# Patient Record
Sex: MALE | Race: BLACK OR AFRICAN AMERICAN | NOT HISPANIC OR LATINO | Employment: FULL TIME | URBAN - METROPOLITAN AREA
[De-identification: names, ages, dates, MRNs, and addresses within clinical notes are randomized per-mention and may not be internally consistent; named-entity substitution may affect disease eponyms.]

---

## 2021-07-20 ENCOUNTER — APPOINTMENT (OUTPATIENT)
Dept: RADIOLOGY | Facility: MEDICAL CENTER | Age: 56
DRG: 637 | End: 2021-07-20
Attending: EMERGENCY MEDICINE
Payer: COMMERCIAL

## 2021-07-20 ENCOUNTER — HOSPITAL ENCOUNTER (INPATIENT)
Facility: MEDICAL CENTER | Age: 56
LOS: 5 days | DRG: 637 | End: 2021-07-25
Attending: EMERGENCY MEDICINE | Admitting: INTERNAL MEDICINE
Payer: COMMERCIAL

## 2021-07-20 DIAGNOSIS — E11.11 DIABETIC KETOACIDOSIS WITH COMA ASSOCIATED WITH TYPE 2 DIABETES MELLITUS (HCC): ICD-10-CM

## 2021-07-20 PROBLEM — E11.00 HYPEROSMOLAR HYPERGLYCEMIC STATE (HHS) (HCC): Status: ACTIVE | Noted: 2021-07-20

## 2021-07-20 PROBLEM — N17.9 ACUTE KIDNEY INJURY (HCC): Status: ACTIVE | Noted: 2021-07-20

## 2021-07-20 PROBLEM — G93.41 ACUTE METABOLIC ENCEPHALOPATHY: Status: ACTIVE | Noted: 2021-07-20

## 2021-07-20 PROBLEM — I10 ESSENTIAL HYPERTENSION: Status: ACTIVE | Noted: 2021-07-20

## 2021-07-20 PROBLEM — E87.0 HYPERNATREMIA: Status: ACTIVE | Noted: 2021-07-20

## 2021-07-20 PROBLEM — R91.8 LUNG FIELD ABNORMAL FINDING ON EXAMINATION: Status: ACTIVE | Noted: 2021-07-20

## 2021-07-20 PROBLEM — Z21 HIV POSITIVE (HCC): Status: ACTIVE | Noted: 2021-07-20

## 2021-07-20 LAB
ALBUMIN SERPL BCP-MCNC: 4.4 G/DL (ref 3.2–4.9)
ALBUMIN/GLOB SERPL: 1 G/DL
ALP SERPL-CCNC: 117 U/L (ref 30–99)
ALT SERPL-CCNC: 32 U/L (ref 2–50)
ANION GAP SERPL CALC-SCNC: 23 MMOL/L (ref 7–16)
ANION GAP SERPL CALC-SCNC: 28 MMOL/L (ref 7–16)
ANION GAP SERPL CALC-SCNC: 29 MMOL/L (ref 7–16)
APPEARANCE UR: CLEAR
APPEARANCE UR: CLEAR
AST SERPL-CCNC: 15 U/L (ref 12–45)
B-OH-BUTYR SERPL-MCNC: >8 MMOL/L (ref 0.02–0.27)
BACTERIA #/AREA URNS HPF: ABNORMAL /HPF
BACTERIA #/AREA URNS HPF: ABNORMAL /HPF
BASE EXCESS BLDV CALC-SCNC: -13 MMOL/L
BASOPHILS # BLD AUTO: 0.3 % (ref 0–1.8)
BASOPHILS # BLD: 0.03 K/UL (ref 0–0.12)
BILIRUB SERPL-MCNC: 0.3 MG/DL (ref 0.1–1.5)
BILIRUB UR QL STRIP.AUTO: NEGATIVE
BILIRUB UR QL STRIP.AUTO: NEGATIVE
BODY TEMPERATURE: ABNORMAL CENTIGRADE
BUN SERPL-MCNC: 70 MG/DL (ref 8–22)
BUN SERPL-MCNC: 76 MG/DL (ref 8–22)
BUN SERPL-MCNC: 86 MG/DL (ref 8–22)
CALCIUM SERPL-MCNC: 7.9 MG/DL (ref 8.5–10.5)
CALCIUM SERPL-MCNC: 8.5 MG/DL (ref 8.5–10.5)
CALCIUM SERPL-MCNC: 8.7 MG/DL (ref 8.5–10.5)
CHLORIDE SERPL-SCNC: 100 MMOL/L (ref 96–112)
CHLORIDE SERPL-SCNC: 113 MMOL/L (ref 96–112)
CHLORIDE SERPL-SCNC: 122 MMOL/L (ref 96–112)
CHLORIDE UR-SCNC: <20 MMOL/L
CO2 SERPL-SCNC: 13 MMOL/L (ref 20–33)
CO2 SERPL-SCNC: 16 MMOL/L (ref 20–33)
CO2 SERPL-SCNC: 18 MMOL/L (ref 20–33)
COLOR UR: YELLOW
COLOR UR: YELLOW
CREAT SERPL-MCNC: 2.87 MG/DL (ref 0.5–1.4)
CREAT SERPL-MCNC: 3.62 MG/DL (ref 0.5–1.4)
CREAT SERPL-MCNC: 4.51 MG/DL (ref 0.5–1.4)
CREAT UR-MCNC: 42.44 MG/DL
EOSINOPHIL # BLD AUTO: 0 K/UL (ref 0–0.51)
EOSINOPHIL NFR BLD: 0 % (ref 0–6.9)
EPI CELLS #/AREA URNS HPF: ABNORMAL /HPF
EPI CELLS #/AREA URNS HPF: ABNORMAL /HPF
ERYTHROCYTE [DISTWIDTH] IN BLOOD BY AUTOMATED COUNT: 51.3 FL (ref 35.9–50)
GLOBULIN SER CALC-MCNC: 4.3 G/DL (ref 1.9–3.5)
GLUCOSE SERPL-MCNC: 1001 MG/DL (ref 65–99)
GLUCOSE SERPL-MCNC: 1096 MG/DL (ref 65–99)
GLUCOSE SERPL-MCNC: 1292 MG/DL (ref 65–99)
GLUCOSE SERPL-MCNC: 722 MG/DL (ref 65–99)
GLUCOSE SERPL-MCNC: 894 MG/DL (ref 65–99)
GLUCOSE UR STRIP.AUTO-MCNC: >=1000 MG/DL
GLUCOSE UR STRIP.AUTO-MCNC: >=1000 MG/DL
HCO3 BLDV-SCNC: 14 MMOL/L (ref 24–28)
HCT VFR BLD AUTO: 41.4 % (ref 42–52)
HGB BLD-MCNC: 12.6 G/DL (ref 14–18)
HYALINE CASTS #/AREA URNS LPF: ABNORMAL /LPF
HYALINE CASTS #/AREA URNS LPF: ABNORMAL /LPF
IMM GRANULOCYTES # BLD AUTO: 0.05 K/UL (ref 0–0.11)
IMM GRANULOCYTES NFR BLD AUTO: 0.4 % (ref 0–0.9)
INR PPP: 1.37 (ref 0.87–1.13)
KETONES UR STRIP.AUTO-MCNC: ABNORMAL MG/DL
KETONES UR STRIP.AUTO-MCNC: ABNORMAL MG/DL
LACTATE BLD-SCNC: 1.7 MMOL/L (ref 0.5–2)
LACTATE BLD-SCNC: 2.2 MMOL/L (ref 0.5–2)
LACTATE BLD-SCNC: 3.6 MMOL/L (ref 0.5–2)
LEUKOCYTE ESTERASE UR QL STRIP.AUTO: NEGATIVE
LEUKOCYTE ESTERASE UR QL STRIP.AUTO: NEGATIVE
LYMPHOCYTES # BLD AUTO: 0.91 K/UL (ref 1–4.8)
LYMPHOCYTES NFR BLD: 7.9 % (ref 22–41)
MAGNESIUM SERPL-MCNC: 4 MG/DL (ref 1.5–2.5)
MCH RBC QN AUTO: 28.1 PG (ref 27–33)
MCHC RBC AUTO-ENTMCNC: 30.4 G/DL (ref 33.7–35.3)
MCV RBC AUTO: 92.2 FL (ref 81.4–97.8)
MICRO URNS: ABNORMAL
MICRO URNS: ABNORMAL
MONOCYTES # BLD AUTO: 1.1 K/UL (ref 0–0.85)
MONOCYTES NFR BLD AUTO: 9.5 % (ref 0–13.4)
MUCOUS THREADS #/AREA URNS HPF: ABNORMAL /HPF
MUCOUS THREADS #/AREA URNS HPF: ABNORMAL /HPF
NEUTROPHILS # BLD AUTO: 9.46 K/UL (ref 1.82–7.42)
NEUTROPHILS NFR BLD: 81.9 % (ref 44–72)
NITRITE UR QL STRIP.AUTO: NEGATIVE
NITRITE UR QL STRIP.AUTO: NEGATIVE
NRBC # BLD AUTO: 0 K/UL
NRBC BLD-RTO: 0 /100 WBC
OSMOLALITY SERPL: 425 MOSM/KG H2O (ref 278–298)
PCO2 BLDV: 34.4 MMHG (ref 41–51)
PH BLDV: 7.23 [PH] (ref 7.31–7.45)
PH UR STRIP.AUTO: 5 [PH] (ref 5–8)
PH UR STRIP.AUTO: 5 [PH] (ref 5–8)
PHOSPHATE SERPL-MCNC: 7.3 MG/DL (ref 2.5–4.5)
PHOSPHATE SERPL-MCNC: 7.4 MG/DL (ref 2.5–4.5)
PLATELET # BLD AUTO: 240 K/UL (ref 164–446)
PMV BLD AUTO: 11.5 FL (ref 9–12.9)
PO2 BLDV: 79.3 MMHG (ref 25–40)
POTASSIUM SERPL-SCNC: 4.9 MMOL/L (ref 3.6–5.5)
POTASSIUM SERPL-SCNC: 5.7 MMOL/L (ref 3.6–5.5)
POTASSIUM SERPL-SCNC: 6.3 MMOL/L (ref 3.6–5.5)
POTASSIUM UR-SCNC: 31 MMOL/L
PROT SERPL-MCNC: 8.7 G/DL (ref 6–8.2)
PROT UR QL STRIP: 30 MG/DL
PROT UR QL STRIP: 30 MG/DL
PROTHROMBIN TIME: 16.5 SEC (ref 12–14.6)
RBC # BLD AUTO: 4.49 M/UL (ref 4.7–6.1)
RBC # URNS HPF: ABNORMAL /HPF
RBC UR QL AUTO: ABNORMAL
RBC UR QL AUTO: ABNORMAL
RENAL EPI CELLS #/AREA URNS HPF: ABNORMAL /HPF
SAO2 % BLDV: 90.2 %
SODIUM SERPL-SCNC: 145 MMOL/L (ref 135–145)
SODIUM SERPL-SCNC: 154 MMOL/L (ref 135–145)
SODIUM SERPL-SCNC: 163 MMOL/L (ref 135–145)
SODIUM UR-SCNC: <20 MMOL/L
SP GR UR STRIP.AUTO: 1.03
SP GR UR STRIP.AUTO: 1.03
TROPONIN T SERPL-MCNC: 58 NG/L (ref 6–19)
UROBILINOGEN UR STRIP.AUTO-MCNC: 0.2 MG/DL
UROBILINOGEN UR STRIP.AUTO-MCNC: 0.2 MG/DL
WBC # BLD AUTO: 11.6 K/UL (ref 4.8–10.8)
WBC #/AREA URNS HPF: ABNORMAL /HPF
WBC #/AREA URNS HPF: ABNORMAL /HPF

## 2021-07-20 PROCEDURE — 85025 COMPLETE CBC W/AUTO DIFF WBC: CPT

## 2021-07-20 PROCEDURE — 700105 HCHG RX REV CODE 258: Performed by: INTERNAL MEDICINE

## 2021-07-20 PROCEDURE — 84100 ASSAY OF PHOSPHORUS: CPT

## 2021-07-20 PROCEDURE — 70450 CT HEAD/BRAIN W/O DYE: CPT

## 2021-07-20 PROCEDURE — 99291 CRITICAL CARE FIRST HOUR: CPT | Mod: 25 | Performed by: INTERNAL MEDICINE

## 2021-07-20 PROCEDURE — 770022 HCHG ROOM/CARE - ICU (200)

## 2021-07-20 PROCEDURE — 82803 BLOOD GASES ANY COMBINATION: CPT

## 2021-07-20 PROCEDURE — 82962 GLUCOSE BLOOD TEST: CPT | Mod: 91

## 2021-07-20 PROCEDURE — 36620 INSERTION CATHETER ARTERY: CPT | Performed by: INTERNAL MEDICINE

## 2021-07-20 PROCEDURE — 80053 COMPREHEN METABOLIC PANEL: CPT

## 2021-07-20 PROCEDURE — 99291 CRITICAL CARE FIRST HOUR: CPT

## 2021-07-20 PROCEDURE — 306565 RIGID MIT RESTRAINT(PAIR): Performed by: INTERNAL MEDICINE

## 2021-07-20 PROCEDURE — 71045 X-RAY EXAM CHEST 1 VIEW: CPT

## 2021-07-20 PROCEDURE — 83605 ASSAY OF LACTIC ACID: CPT

## 2021-07-20 PROCEDURE — 85610 PROTHROMBIN TIME: CPT

## 2021-07-20 PROCEDURE — 86359 T CELLS TOTAL COUNT: CPT

## 2021-07-20 PROCEDURE — 4A133B1 MONITORING OF ARTERIAL PRESSURE, PERIPHERAL, PERCUTANEOUS APPROACH: ICD-10-PCS | Performed by: INTERNAL MEDICINE

## 2021-07-20 PROCEDURE — 700105 HCHG RX REV CODE 258: Performed by: EMERGENCY MEDICINE

## 2021-07-20 PROCEDURE — 82947 ASSAY GLUCOSE BLOOD QUANT: CPT

## 2021-07-20 PROCEDURE — 4A133J1 MONITORING OF ARTERIAL PULSE, PERIPHERAL, PERCUTANEOUS APPROACH: ICD-10-PCS | Performed by: INTERNAL MEDICINE

## 2021-07-20 PROCEDURE — 700102 HCHG RX REV CODE 250 W/ 637 OVERRIDE(OP): Performed by: INTERNAL MEDICINE

## 2021-07-20 PROCEDURE — 82436 ASSAY OF URINE CHLORIDE: CPT

## 2021-07-20 PROCEDURE — 80048 BASIC METABOLIC PNL TOTAL CA: CPT | Mod: 91

## 2021-07-20 PROCEDURE — 86360 T CELL ABSOLUTE COUNT/RATIO: CPT

## 2021-07-20 PROCEDURE — 82570 ASSAY OF URINE CREATININE: CPT

## 2021-07-20 PROCEDURE — 03HY32Z INSERTION OF MONITORING DEVICE INTO UPPER ARTERY, PERCUTANEOUS APPROACH: ICD-10-PCS | Performed by: INTERNAL MEDICINE

## 2021-07-20 PROCEDURE — 84133 ASSAY OF URINE POTASSIUM: CPT

## 2021-07-20 PROCEDURE — 81001 URINALYSIS AUTO W/SCOPE: CPT

## 2021-07-20 PROCEDURE — 99291 CRITICAL CARE FIRST HOUR: CPT | Performed by: INTERNAL MEDICINE

## 2021-07-20 PROCEDURE — 87086 URINE CULTURE/COLONY COUNT: CPT

## 2021-07-20 PROCEDURE — 84300 ASSAY OF URINE SODIUM: CPT

## 2021-07-20 PROCEDURE — 84484 ASSAY OF TROPONIN QUANT: CPT

## 2021-07-20 PROCEDURE — 83735 ASSAY OF MAGNESIUM: CPT

## 2021-07-20 PROCEDURE — 36620 INSERTION CATHETER ARTERY: CPT

## 2021-07-20 PROCEDURE — 87040 BLOOD CULTURE FOR BACTERIA: CPT

## 2021-07-20 PROCEDURE — 83930 ASSAY OF BLOOD OSMOLALITY: CPT

## 2021-07-20 PROCEDURE — 82010 KETONE BODYS QUAN: CPT

## 2021-07-20 RX ORDER — DEXTROSE, SODIUM CHLORIDE, SODIUM LACTATE, POTASSIUM CHLORIDE, AND CALCIUM CHLORIDE 5; .6; .31; .03; .02 G/100ML; G/100ML; G/100ML; G/100ML; G/100ML
INJECTION, SOLUTION INTRAVENOUS CONTINUOUS
Status: DISCONTINUED | OUTPATIENT
Start: 2021-07-20 | End: 2021-07-21

## 2021-07-20 RX ORDER — SODIUM CHLORIDE 9 MG/ML
INJECTION, SOLUTION INTRAVENOUS CONTINUOUS
Status: DISCONTINUED | OUTPATIENT
Start: 2021-07-20 | End: 2021-07-20

## 2021-07-20 RX ORDER — HEPARIN SODIUM 5000 [USP'U]/ML
5000 INJECTION, SOLUTION INTRAVENOUS; SUBCUTANEOUS EVERY 8 HOURS
Status: DISCONTINUED | OUTPATIENT
Start: 2021-07-21 | End: 2021-07-25 | Stop reason: HOSPADM

## 2021-07-20 RX ORDER — SODIUM CHLORIDE, SODIUM LACTATE, POTASSIUM CHLORIDE, CALCIUM CHLORIDE 600; 310; 30; 20 MG/100ML; MG/100ML; MG/100ML; MG/100ML
1000 INJECTION, SOLUTION INTRAVENOUS ONCE
Status: COMPLETED | OUTPATIENT
Start: 2021-07-20 | End: 2021-07-20

## 2021-07-20 RX ORDER — POLYETHYLENE GLYCOL 3350 17 G/17G
1 POWDER, FOR SOLUTION ORAL
Status: DISCONTINUED | OUTPATIENT
Start: 2021-07-20 | End: 2021-07-25 | Stop reason: HOSPADM

## 2021-07-20 RX ORDER — ONDANSETRON 2 MG/ML
4 INJECTION INTRAMUSCULAR; INTRAVENOUS EVERY 4 HOURS PRN
Status: DISCONTINUED | OUTPATIENT
Start: 2021-07-20 | End: 2021-07-25 | Stop reason: HOSPADM

## 2021-07-20 RX ORDER — ONDANSETRON 4 MG/1
4 TABLET, ORALLY DISINTEGRATING ORAL EVERY 4 HOURS PRN
Status: DISCONTINUED | OUTPATIENT
Start: 2021-07-20 | End: 2021-07-25 | Stop reason: HOSPADM

## 2021-07-20 RX ORDER — DEXTROSE AND SODIUM CHLORIDE 10; .45 G/100ML; G/100ML
INJECTION, SOLUTION INTRAVENOUS CONTINUOUS
Status: DISCONTINUED | OUTPATIENT
Start: 2021-07-20 | End: 2021-07-20

## 2021-07-20 RX ORDER — AMOXICILLIN 250 MG
2 CAPSULE ORAL 2 TIMES DAILY
Status: DISCONTINUED | OUTPATIENT
Start: 2021-07-21 | End: 2021-07-25 | Stop reason: HOSPADM

## 2021-07-20 RX ORDER — SODIUM CHLORIDE 9 MG/ML
1000 INJECTION, SOLUTION INTRAVENOUS ONCE
Status: COMPLETED | OUTPATIENT
Start: 2021-07-20 | End: 2021-07-20

## 2021-07-20 RX ORDER — SODIUM CHLORIDE 9 MG/ML
2000 INJECTION, SOLUTION INTRAVENOUS ONCE
Status: ACTIVE | OUTPATIENT
Start: 2021-07-20 | End: 2021-07-21

## 2021-07-20 RX ORDER — PROMETHAZINE HYDROCHLORIDE 25 MG/1
12.5-25 TABLET ORAL EVERY 4 HOURS PRN
Status: DISCONTINUED | OUTPATIENT
Start: 2021-07-20 | End: 2021-07-20

## 2021-07-20 RX ORDER — SODIUM CHLORIDE, SODIUM LACTATE, POTASSIUM CHLORIDE, AND CALCIUM CHLORIDE .6; .31; .03; .02 G/100ML; G/100ML; G/100ML; G/100ML
2000 INJECTION, SOLUTION INTRAVENOUS ONCE
Status: DISCONTINUED | OUTPATIENT
Start: 2021-07-20 | End: 2021-07-20

## 2021-07-20 RX ORDER — PROMETHAZINE HYDROCHLORIDE 25 MG/1
12.5-25 SUPPOSITORY RECTAL EVERY 4 HOURS PRN
Status: DISCONTINUED | OUTPATIENT
Start: 2021-07-20 | End: 2021-07-20

## 2021-07-20 RX ORDER — SODIUM CHLORIDE, SODIUM LACTATE, POTASSIUM CHLORIDE, CALCIUM CHLORIDE 600; 310; 30; 20 MG/100ML; MG/100ML; MG/100ML; MG/100ML
INJECTION, SOLUTION INTRAVENOUS CONTINUOUS
Status: DISCONTINUED | OUTPATIENT
Start: 2021-07-20 | End: 2021-07-21

## 2021-07-20 RX ORDER — BISACODYL 10 MG
10 SUPPOSITORY, RECTAL RECTAL
Status: DISCONTINUED | OUTPATIENT
Start: 2021-07-20 | End: 2021-07-25 | Stop reason: HOSPADM

## 2021-07-20 RX ORDER — DEXTROSE AND SODIUM CHLORIDE 5; .9 G/100ML; G/100ML
INJECTION, SOLUTION INTRAVENOUS CONTINUOUS
Status: DISCONTINUED | OUTPATIENT
Start: 2021-07-20 | End: 2021-07-20

## 2021-07-20 RX ORDER — MAGNESIUM SULFATE HEPTAHYDRATE 40 MG/ML
2 INJECTION, SOLUTION INTRAVENOUS
Status: DISCONTINUED | OUTPATIENT
Start: 2021-07-20 | End: 2021-07-20

## 2021-07-20 RX ORDER — MAGNESIUM SULFATE HEPTAHYDRATE 40 MG/ML
4 INJECTION, SOLUTION INTRAVENOUS
Status: DISCONTINUED | OUTPATIENT
Start: 2021-07-20 | End: 2021-07-22

## 2021-07-20 RX ORDER — MAGNESIUM SULFATE HEPTAHYDRATE 40 MG/ML
4 INJECTION, SOLUTION INTRAVENOUS
Status: DISCONTINUED | OUTPATIENT
Start: 2021-07-20 | End: 2021-07-20

## 2021-07-20 RX ORDER — SODIUM CHLORIDE 9 MG/ML
2000 INJECTION, SOLUTION INTRAVENOUS ONCE
Status: DISCONTINUED | OUTPATIENT
Start: 2021-07-20 | End: 2021-07-20

## 2021-07-20 RX ORDER — MAGNESIUM SULFATE HEPTAHYDRATE 40 MG/ML
2 INJECTION, SOLUTION INTRAVENOUS
Status: DISCONTINUED | OUTPATIENT
Start: 2021-07-20 | End: 2021-07-22

## 2021-07-20 RX ORDER — PROCHLORPERAZINE EDISYLATE 5 MG/ML
5-10 INJECTION INTRAMUSCULAR; INTRAVENOUS EVERY 4 HOURS PRN
Status: DISCONTINUED | OUTPATIENT
Start: 2021-07-20 | End: 2021-07-20

## 2021-07-20 RX ADMIN — SODIUM CHLORIDE, POTASSIUM CHLORIDE, SODIUM LACTATE AND CALCIUM CHLORIDE: 600; 310; 30; 20 INJECTION, SOLUTION INTRAVENOUS at 21:35

## 2021-07-20 RX ADMIN — SODIUM CHLORIDE, POTASSIUM CHLORIDE, SODIUM LACTATE AND CALCIUM CHLORIDE: 600; 310; 30; 20 INJECTION, SOLUTION INTRAVENOUS at 17:11

## 2021-07-20 RX ADMIN — SODIUM CHLORIDE 1000 ML: 9 INJECTION, SOLUTION INTRAVENOUS at 15:09

## 2021-07-20 RX ADMIN — SODIUM CHLORIDE 1000 ML: 9 INJECTION, SOLUTION INTRAVENOUS at 14:16

## 2021-07-20 RX ADMIN — SODIUM CHLORIDE 4 UNITS/HR: 9 INJECTION, SOLUTION INTRAVENOUS at 17:15

## 2021-07-20 RX ADMIN — SODIUM CHLORIDE, POTASSIUM CHLORIDE, SODIUM LACTATE AND CALCIUM CHLORIDE 1000 ML: 600; 310; 30; 20 INJECTION, SOLUTION INTRAVENOUS at 12:10

## 2021-07-20 ASSESSMENT — PAIN DESCRIPTION - PAIN TYPE
TYPE: ACUTE PAIN

## 2021-07-20 NOTE — ED NOTES
Med rec complete per pt's family at bedside. NKDA. No ABX taken in the last 30 days.       Medication Sig   • aspirin EC (ECOTRIN) 81 MG Tablet Delayed Response Take 81 mg by mouth every day.   • multivitamin (THERAGRAN) Tab Take 1 tablet by mouth every day.

## 2021-07-20 NOTE — ASSESSMENT & PLAN NOTE
Secondary to dehydration, DKA  Improving slowly, monitor urine output  Troponin stable around 1.5 and possibly that is his baseline

## 2021-07-20 NOTE — ASSESSMENT & PLAN NOTE
Started on antiviral treatment with the help of infectious disease,  Close outpatient follow-up in the clinic

## 2021-07-20 NOTE — ED TRIAGE NOTES
"Chief Complaint   Patient presents with   • ALOC     AOx1, GCS 13 - very lethargic and difficulty with following commands. No reported N/V/D, cough, fever, or pain. No recent trauma.   • Hyperglycemia     Glucometer reads \"Hi\" - With no history of DM. The patient is HIV+ and has been off his medications for 6 months.     Patient BIBA for above complaints. 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 BSFS with EMS and in ER read \"Hi\". Patient is sepsis score of 4.    Vitals:    07/20/21 1146   BP: (!) 98/68   Pulse: (!) 105   Resp: (!) 25   Temp: 36.7 °C (98 °F)   SpO2: 96%       " APPENDECTOMY      TONSILLECTOMY         Social History   Substance Use Topics    Smoking status: Never Smoker    Smokeless tobacco: Never Used    Alcohol use 24.0 oz/week     40 Cans of beer per week       No Known Allergies    Current Outpatient Prescriptions   Medication Sig Dispense Refill    amiodarone (CORDARONE) 200 MG tablet Take 200 mg by mouth daily       dabigatran (PRADAXA) 150 MG capsule Take 150 mg by mouth 2 times daily      metoprolol (LOPRESSOR) 25 MG tablet Take 25 mg by mouth 2 times daily      lisinopril (PRINIVIL;ZESTRIL) 2.5 MG tablet Take 2.5 mg by mouth daily       No current facility-administered medications for this visit. No family history on file. /66   Pulse 52   Temp 98.3 °F (36.8 °C)   Ht 6' 3\" (1.905 m)   Wt 244 lb 12.8 oz (111 kg)   SpO2 97% Comment: room air at rest  BMI 30.60 kg/m²     BMI:  Body mass index is 30.6 kg/m². Mallampati airway Class:4  Neck Circumference:.17 Inches  Grinnell sleepiness score 3/29/18: 2      Physical Exam :  Constitutional: Patient appears moderately built and moderately nourished. No distress. Patient is oriented to person, place, and time. HENT:   Head: Normocephalic and atraumatic. Right Ear: External ear normal.   Left Ear: External ear normal.   Mouth/Throat: Oropharynx is clear and moist.   Eyes: Conjunctivae are normal. Pupils are equal and reactive to light. No scleral icterus. Neck: Neck supple. No JVD present. Cardiovascular: Bradycardia, regular rhythm, normal heart sounds. No murmur heard. Pulmonary/Chest: Effort normal and breath sounds normal. No stridor. No respiratory distress. No wheezes. No rales. Abdominal: Soft. Patient exhibits no distension. No tenderness. Musculoskeletal: Normal range of motion. Extremities: Patient exhibits no erythema or no edema. Lymphadenopathy:  No cervical adenopathy. Neurological: Patient is alert and oriented to person, place, and time.    Skin: Skin is

## 2021-07-20 NOTE — H&P
"Hospital Medicine History & Physical Note    Date of Service  7/20/2021    Primary Care Physician  No primary care provider on file.    Consultants  critical care    Specialist Names: Dr. Cisse, Intensivist    Code Status  No Order  FULL CODE  Chief Complaint  Chief Complaint   Patient presents with   • ALOC     AOx1, GCS 13 - very lethargic and difficulty with following commands. No reported N/V/D, cough, fever, or pain. No recent trauma.   • Hyperglycemia     Glucometer reads \"Hi\" - With no history of DM. The patient is HIV+ and has been off his medications for 6 months.       History of Presenting Illness  Kori Awan is a 56 y.o. male who presented 7/20/2021 with ALOC (AOx1, GCS 13 - very lethargic and difficulty with following commands. No reported N/V/D, cough, fever, or pain. No recent trauma.) and Hyperglycemia (Glucometer reads \"Hi\" - With no history of DM. The patient is HIV+ and has been off his medications for 6 months.)  Essentially nonhistorian, weak and confused. Wife and friend? at bedside.  56 y.o. male who has a history of HIV, not on HAART medications for 6mo (wife mentioned his viral levels were undectable a few months ago, was tested at [place]), currently not following doctor, presents with ALOC (AOx1, GCS 13 - very lethargic and difficulty with following commands. No reported N/V/D, cough, fever, or pain. No recent trauma.) and Hyperglycemia (Glucometer reads \"Hi\" - With no history of DM. The patient is HIV+ and has been off his medications for 6 months.)   on 7/20/2021   He has no prior diagnosis of diabetes however per wife, he has always been thirsty, drinking a lot of fluids and always urinating for years. He was born in and resided in [place], hence it is unclear if he has a family history of diabetes. 2d ago he was feeling confused and weak. The confusion worsened that wife brought him in today. He continues to be thirsty, drinking a lot of fluids and urinating excessively.   He " completed his vaccinations.   At the ED afebrile, normotensive.  HYPERGLYCEMIC. Na upper limits of normal. Elevated BUN and Cr-, hyperkalemic.   Fluids and insulin drip ordered.   When I saw him at ED, he looked weak. Dry skin and mucous membranes.    I discussed the plan of care with family and bedside RN.    Review of Systems  Review of Systems   Unable to perform ROS: Mental acuity       Past Medical History   has a past medical history of HIV (human immunodeficiency virus infection) ([facility]) (2006).    Surgical History   has no past surgical history on file.     Family History  family history is not on file.   Unknown if diabetes (see above)  Family history reviewed with patient. There is family history that is pertinent to the chief complaint.     Social History   reports that he has never smoked. He has never used smokeless tobacco. He reports previous alcohol use. He reports that he does not use drugs.    Allergies  No Known Allergies    Medications  Prior to Admission Medications   Prescriptions Last Dose Informant Patient Reported? Taking?   aspirin EC (ECOTRIN) 81 MG Tablet Delayed Response 7/18/2021 at Unknown time  Yes Yes   Sig: Take 81 mg by mouth every day.   multivitamin (THERAGRAN) Tab 7/18/2021 at Unknown time  Yes Yes   Sig: Take 1 tablet by mouth every day.      Facility-Administered Medications: None       Physical Exam  Temp:  [36.7 °C (98 °F)] 36.7 °C (98 °F)  Pulse:  [] 99  Resp:  [25-39] 36  BP: ()/(62-75) 120/73  SpO2:  [94 %-97 %] 97 %    Physical Exam  Vitals and nursing note reviewed.   HENT:      Head: Normocephalic and atraumatic.      Right Ear: External ear normal.      Left Ear: External ear normal.      Nose: Nose normal.      Mouth/Throat:      Mouth: Mucous membranes are dry.   Eyes:      General: No scleral icterus.     Conjunctiva/sclera: Conjunctivae normal.   Cardiovascular:      Rate and Rhythm: Normal rate and regular rhythm.      Heart sounds: No murmur heard.    No friction rub. No gallop.    Pulmonary:      Effort: Pulmonary effort is normal.      Breath sounds: Normal breath sounds.   Abdominal:      General: Abdomen is flat. Bowel sounds are normal. There is no distension.      Palpations: Abdomen is soft.      Tenderness: There is no abdominal tenderness. There is no guarding.   Musculoskeletal:         General: Normal range of motion.      Cervical back: Normal range of motion and neck supple.   Skin:     General: Skin is warm and dry.   Neurological:      Mental Status: He is alert.      Motor: Weakness present.      Comments: COnfused     Psychiatric:         Mood and Affect: Mood normal.         Behavior: Behavior normal.         Thought Content: Thought content normal.         Judgment: Judgment normal.         Laboratory:  Recent Labs     07/20/21  1315   WBC 11.6*   RBC 4.49*   HEMOGLOBIN 12.6*   HEMATOCRIT 41.4*   MCV 92.2   MCH 28.1   MCHC 30.4*   RDW 51.3*   PLATELETCT 240   MPV 11.5     Recent Labs     07/20/21  1315   SODIUM 145   POTASSIUM 6.3*   CHLORIDE 100   CO2 16*   GLUCOSE 1292*   BUN 86*   CREATININE 4.51*   CALCIUM 8.5     Recent Labs     07/20/21  1315   ALTSGPT 32   ASTSGOT 15   ALKPHOSPHAT 117*   TBILIRUBIN 0.3   GLUCOSE 1292*     Recent Labs     07/20/21  1315   INR 1.37*     No results for input(s): NTPROBNP in the last 72 hours.      Recent Labs     07/20/21  1315   TROPONINT 58*       Imaging:  DX-CHEST-PORTABLE (1 VIEW)   Final Result      No acute cardiac or pulmonary abnormalities are identified.      CT-HEAD W/O   Final Result      No acute intracranial abnormality.             X-Ray:  My impression is: clear    Assessment/Plan:  I anticipate this patient will require at least two midnights for appropriate medical management, necessitating inpatient admission.    * Hyperosmolar hyperglycemic state (HHS) ([facility])  Assessment & Plan  Ordered IV fluids. Discussed with Pharmacy. Will stick with isotonic fluids.  Ordered insulin drip,. Should  drive K+  Labs Q4h  Ordered A1c and diabetes teaching.  Will need outpatient follow up for diabetes.     HIV positive ([facility])  Assessment & Plan  Ordered HIV diagnostic. If positive please order HIV quant  Establish with Dr. Pérez/[facility] in the outpatient    Acute kidney injury ([facility])  Assessment & Plan  Ordered urinalysis  Since he is urinating unlikely obstructive process. Monitor K+  With fluid hydration should improve.      VTE prophylaxis: heparin ppx     SOAP note:    Emperatriz Sheikh is and remains critically ill.  The patient continues to have: hyperglycemia, lethargy, confusion  The vital organ system that is affected is the: endocrine, vascular  If untreated there is a high chance of deterioration into: Cardiorespiratory collapse  And eventually death.   The critical care that I am providing today:   The critical management that has been undertaken is medically complex.   There has been no overlap in critical care time.   I personally provided 40 minutes critical care time and extensive data review exclusive of time spent on procedures. Time includes review of laboratory data, imaging, discussion with consultants and staff and monitoring hyperkalemia, blood pressure, acute kidney injury, profound hyperglycemia and mental status changes for potential decompensation  Start time: 300 PM  Stop time: 340 PM

## 2021-07-20 NOTE — ASSESSMENT & PLAN NOTE
S/p ICU treatment, insulin drip, currently improved,  Continue short and long-acting insulin and adjust as indicated,  High hemoglobin A1c

## 2021-07-20 NOTE — ED PROVIDER NOTES
"ED Provider Note    CHIEF COMPLAINT  Chief Complaint   Patient presents with   • ALOC     AOx1, GCS 13 - very lethargic and difficulty with following commands. No reported N/V/D, cough, fever, or pain. No recent trauma.   • Hyperglycemia     Glucometer reads \"Hi\" - With no history of DM. The patient is HIV+ and has been off his medications for 6 months.       HPI  Kori Awan is a 56 y.o. male who presents for altered mental status.  Patient has a history of HIV, noncompliant with medications.  He apparently was last known well 2 days ago, his wife saw him today and he was significantly altered, normal and was called at which point they checked a glucose in the glucometer read as high.  Patient is unable to provide any further history as he is significantly altered.    Patient family came to bedside around 30 minutes after patient's arrival, they report that over the last few weeks he has been drinking a considerable amount and urinating more frequently than normal.  They report that he became confused around 72 hours ago and this is progressively worsened.  They report that he has not been taking his HIV medicines for the last 6 months as he ran out and does not have a primary care physician.  They report that patient does not have any history of diabetes or other major medical problems outside of his HIV.  Prior to patient stopping his HIV medications he had an undetectable viral load.      REVIEW OF SYSTEMS  ROS    See [facility] for further details. All other systems are negative.     PAST MEDICAL HISTORY   has a past medical history of HIV (human immunodeficiency virus infection) ([facility]) (2006).    SOCIAL HISTORY  Social History     Tobacco Use   • Smoking status: Never Smoker   • Smokeless tobacco: Never Used   Substance and Sexual Activity   • Alcohol use: Not Currently   • Drug use: Never   • Sexual activity: Not on file       SURGICAL HISTORY  patient denies any surgical history    CURRENT MEDICATIONS  Home " Medications     Reviewed by Sidney Singh R.N. (Registered Nurse) on 07/20/21 at 1158  Med List Status: Partial   Medication Last Dose Status        Patient Artemio Taking any Medications                       ALLERGIES  No Known Allergies    PHYSICAL EXAM  Vitals:    07/20/21 1146   BP: (!) 98/68   Pulse: (!) 105   Resp: (!) 25   Temp: 36.7 °C (98 °F)   SpO2: 96%       Physical Exam  Constitutional:       Appearance: He is well-developed.      Comments: Patient is alert but oriented only to self   HENT:      Head: Normocephalic and atraumatic.   Eyes:      Conjunctiva/sclera: Conjunctivae normal.      Pupils: Pupils are equal, round, and reactive to light.   Cardiovascular:      Rate and Rhythm: Regular rhythm. Tachycardia present.      Heart sounds: No murmur heard.   No friction rub. No gallop.    Pulmonary:      Effort: Pulmonary effort is normal. No respiratory distress.      Breath sounds: Normal breath sounds. No wheezing.   Abdominal:      General: Bowel sounds are normal. There is no distension.      Palpations: Abdomen is soft.      Tenderness: There is no abdominal tenderness. There is no rebound.   Musculoskeletal:      Cervical back: Normal range of motion and neck supple.      Comments: Full range of motion of neck   Skin:     General: Skin is warm and dry.   Neurological:      Comments: Alert and oriented x1, sporadically following commands, moving all extremities   Psychiatric:         Behavior: Behavior normal.           DIAGNOSTIC STUDIES / PROCEDURES    LABS  Results for orders placed or performed during the hospital encounter of 07/20/21   CBC WITH DIFFERENTIAL   Result Value Ref Range    WBC 11.6 (H) 4.8 - 10.8 K/uL    RBC 4.49 (L) 4.70 - 6.10 M/uL    Hemoglobin 12.6 (L) 14.0 - 18.0 g/dL    Hematocrit 41.4 (L) 42.0 - 52.0 %    MCV 92.2 81.4 - 97.8 fL    MCH 28.1 27.0 - 33.0 pg    MCHC 30.4 (L) 33.7 - 35.3 g/dL    RDW 51.3 (H) 35.9 - 50.0 fL    Platelet Count 240 164 - 446 K/uL    MPV 11.5 9.0  - 12.9 fL    Neutrophils-Polys 81.90 (H) 44.00 - 72.00 %    Lymphocytes 7.90 (L) 22.00 - 41.00 %    Monocytes 9.50 0.00 - 13.40 %    Eosinophils 0.00 0.00 - 6.90 %    Basophils 0.30 0.00 - 1.80 %    Immature Granulocytes 0.40 0.00 - 0.90 %    Nucleated RBC 0.00 /100 WBC    Neutrophils (Absolute) 9.46 (H) 1.82 - 7.42 K/uL    Lymphs (Absolute) 0.91 (L) 1.00 - 4.80 K/uL    Monos (Absolute) 1.10 (H) 0.00 - 0.85 K/uL    Eos (Absolute) 0.00 0.00 - 0.51 K/uL    Baso (Absolute) 0.03 0.00 - 0.12 K/uL    Immature Granulocytes (abs) 0.05 0.00 - 0.11 K/uL    NRBC (Absolute) 0.00 K/uL   COMP METABOLIC PANEL   Result Value Ref Range    Sodium 145 135 - 145 mmol/L    Potassium 6.3 (H) 3.6 - 5.5 mmol/L    Chloride 100 96 - 112 mmol/L    Co2 16 (L) 20 - 33 mmol/L    Anion Gap 29.0 (H) 7.0 - 16.0    Glucose 1292 (HH) 65 - 99 mg/dL    Bun 86 (HH) 8 - 22 mg/dL    Creatinine 4.51 (H) 0.50 - 1.40 mg/dL    Calcium 8.5 8.5 - 10.5 mg/dL    AST(SGOT) 15 12 - 45 U/L    ALT(SGPT) 32 2 - 50 U/L    Alkaline Phosphatase 117 (H) 30 - 99 U/L    Total Bilirubin 0.3 0.1 - 1.5 mg/dL    Albumin 4.4 3.2 - 4.9 g/dL    Total Protein 8.7 (H) 6.0 - 8.2 g/dL    Globulin 4.3 (H) 1.9 - 3.5 g/dL    A-G Ratio 1.0 g/dL   URINALYSIS    Specimen: Urine   Result Value Ref Range    Color Yellow     Character Clear     Specific Gravity 1.026 <1.035    Ph 5.0 5.0 - 8.0    Glucose >=1000 (A) Negative mg/dL    Ketones Trace (A) Negative mg/dL    Protein 30 (A) Negative mg/dL    Bilirubin Negative Negative    Urobilinogen, Urine 0.2 Negative    Nitrite Negative Negative    Leukocyte Esterase Negative Negative    Occult Blood Small (A) Negative    Micro Urine Req Microscopic    TROPONIN   Result Value Ref Range    Troponin T 58 (H) 6 - 19 ng/L   VENOUS BLOOD GAS   Result Value Ref Range    Venous Bg Ph 7.23 (L) 7.31 - 7.45    Venous Bg Pco2 34.4 (L) 41.0 - 51.0 mmHg    Venous Bg Po2 79.3 (H) 25.0 - 40.0 mmHg    Venous Bg O2 Saturation 90.2 %    Venous Bg Hco3 14 (L) 24 -  28 mmol/L    Venous Bg Base Excess -13 mmol/L    Body Temp see below Centigrade   BETA-HYDROXYBUTYRIC ACID   Result Value Ref Range    beta-Hydroxybutyric Acid >8.00 (H) 0.02 - 0.27 mmol/L   LACTIC ACID   Result Value Ref Range    Lactic Acid 3.6 (H) 0.5 - 2.0 mmol/L   URINE MICROSCOPIC (W/UA)   Result Value Ref Range    WBC 2-5 (A) /hpf    Bacteria Few (A) None /hpf    Epithelial Cells Rare /hpf    Epithelial Cells Renal Rare /hpf    Mucous Threads Rare /hpf    Hyaline Cast 0-2 /lpf   PT/INR   Result Value Ref Range    PT 16.5 (H) 12.0 - 14.6 sec    INR 1.37 (H) 0.87 - 1.13   ESTIMATED GFR   Result Value Ref Range    GFR If  16 (A) >60 mL/min/1.73 m 2    GFR If Non  14 (A) >60 mL/min/1.73 m 2         RADIOLOGY  DX-CHEST-PORTABLE (1 VIEW)   Final Result      No acute cardiac or pulmonary abnormalities are identified.      CT-HEAD W/O   Final Result      No acute intracranial abnormality.           32 minutes of critical care were spent with patient      COURSE & MEDICAL DECISION MAKING  Pertinent Labs & Imaging studies reviewed. (See chart for details)    Patient here with hyperglycemia in the setting of confusion and HIV.  I believe patient symptoms are most consistent with possible developing DKA, and or HHS.  His symptoms and presentation appear to be metabolic, encephalitis is also possible though lower on my differential.  Occult head trauma is also possible versus mass.  Patient symptoms are not consistent with CVA.  Patient CT head fails to reveal any acute abnormality, his basic labs were significantly delayed as the first sample apparently did not have labels, once the laboratory results did result patient symptoms were clearly secondary to DKA and HHS.  Patient even prior to the results was being aggressively fluid resuscitated.  He also has an associated RACHEL.  Patient's potassium is elevated, I believe it is safe to give an insulin drip.  Patient will be given an insulin  drip and continued IV fluids.  If patient's symptoms fail to improve despite this while he is in the ICU consider LP at that time for possible atypical encephalitis in the setting of poorly controlled HIV.  I did check a CD4 on patient.  I do remember the low suspicion of encephalitis or meningitis.  Patient case discussed with hospitalist, and intensivist who have both agreed to admit.  I have sent urine electrolytes    The patient will return for worsening symptoms and is stable at the time of discharge. The patient verbalizes understanding and will comply.    FINAL IMPRESSION  1.  DKA, HHS, altered mental status, anion gap acidosis, acute kidney injury      Electronically signed by: Ruiz Dye M.D., 7/20/2021 12:00 PM

## 2021-07-21 ENCOUNTER — APPOINTMENT (OUTPATIENT)
Dept: RADIOLOGY | Facility: MEDICAL CENTER | Age: 56
DRG: 637 | End: 2021-07-21
Attending: INTERNAL MEDICINE
Payer: COMMERCIAL

## 2021-07-21 PROBLEM — R91.8 LUNG FIELD ABNORMAL FINDING ON EXAMINATION: Status: RESOLVED | Noted: 2021-07-20 | Resolved: 2021-07-21

## 2021-07-21 LAB
ANION GAP SERPL CALC-SCNC: 11 MMOL/L (ref 7–16)
ANION GAP SERPL CALC-SCNC: 13 MMOL/L (ref 7–16)
ANION GAP SERPL CALC-SCNC: 17 MMOL/L (ref 7–16)
ANION GAP SERPL CALC-SCNC: 20 MMOL/L (ref 7–16)
ANION GAP SERPL CALC-SCNC: 8 MMOL/L (ref 7–16)
BASOPHILS # BLD AUTO: 0.1 % (ref 0–1.8)
BASOPHILS # BLD: 0.01 K/UL (ref 0–0.12)
BUN SERPL-MCNC: 29 MG/DL (ref 8–22)
BUN SERPL-MCNC: 30 MG/DL (ref 8–22)
BUN SERPL-MCNC: 31 MG/DL (ref 8–22)
BUN SERPL-MCNC: 45 MG/DL (ref 8–22)
BUN SERPL-MCNC: 61 MG/DL (ref 8–22)
CALCIUM SERPL-MCNC: 6.7 MG/DL (ref 8.5–10.5)
CALCIUM SERPL-MCNC: 7.2 MG/DL (ref 8.5–10.5)
CALCIUM SERPL-MCNC: 8.5 MG/DL (ref 8.5–10.5)
CALCIUM SERPL-MCNC: 8.7 MG/DL (ref 8.5–10.5)
CALCIUM SERPL-MCNC: 8.8 MG/DL (ref 8.5–10.5)
CHLORIDE SERPL-SCNC: 117 MMOL/L (ref 96–112)
CHLORIDE SERPL-SCNC: 121 MMOL/L (ref 96–112)
CHLORIDE SERPL-SCNC: 127 MMOL/L (ref 96–112)
CHLORIDE SERPL-SCNC: 127 MMOL/L (ref 96–112)
CHLORIDE SERPL-SCNC: 131 MMOL/L (ref 96–112)
CO2 SERPL-SCNC: 14 MMOL/L (ref 20–33)
CO2 SERPL-SCNC: 19 MMOL/L (ref 20–33)
CO2 SERPL-SCNC: 21 MMOL/L (ref 20–33)
CO2 SERPL-SCNC: 26 MMOL/L (ref 20–33)
CO2 SERPL-SCNC: 27 MMOL/L (ref 20–33)
CREAT SERPL-MCNC: 1.04 MG/DL (ref 0.5–1.4)
CREAT SERPL-MCNC: 1.62 MG/DL (ref 0.5–1.4)
CREAT SERPL-MCNC: 1.79 MG/DL (ref 0.5–1.4)
CREAT SERPL-MCNC: 1.98 MG/DL (ref 0.5–1.4)
CREAT SERPL-MCNC: 2.48 MG/DL (ref 0.5–1.4)
EKG IMPRESSION: NORMAL
EOSINOPHIL # BLD AUTO: 0 K/UL (ref 0–0.51)
EOSINOPHIL NFR BLD: 0 % (ref 0–6.9)
ERYTHROCYTE [DISTWIDTH] IN BLOOD BY AUTOMATED COUNT: 46.4 FL (ref 35.9–50)
ERYTHROCYTE [DISTWIDTH] IN BLOOD BY AUTOMATED COUNT: NORMAL FL (ref 35.9–50)
EST. AVERAGE GLUCOSE BLD GHB EST-MCNC: 278 MG/DL
GLUCOSE BLD-MCNC: 103 MG/DL (ref 65–99)
GLUCOSE BLD-MCNC: 105 MG/DL (ref 65–99)
GLUCOSE BLD-MCNC: 113 MG/DL (ref 65–99)
GLUCOSE BLD-MCNC: 121 MG/DL (ref 65–99)
GLUCOSE BLD-MCNC: 125 MG/DL (ref 65–99)
GLUCOSE BLD-MCNC: 126 MG/DL (ref 65–99)
GLUCOSE BLD-MCNC: 133 MG/DL (ref 65–99)
GLUCOSE BLD-MCNC: 135 MG/DL (ref 65–99)
GLUCOSE BLD-MCNC: 146 MG/DL (ref 65–99)
GLUCOSE BLD-MCNC: 146 MG/DL (ref 65–99)
GLUCOSE BLD-MCNC: 185 MG/DL (ref 65–99)
GLUCOSE BLD-MCNC: 207 MG/DL (ref 65–99)
GLUCOSE BLD-MCNC: 211 MG/DL (ref 65–99)
GLUCOSE BLD-MCNC: 306 MG/DL (ref 65–99)
GLUCOSE BLD-MCNC: 369 MG/DL (ref 65–99)
GLUCOSE BLD-MCNC: 397 MG/DL (ref 65–99)
GLUCOSE BLD-MCNC: 424 MG/DL (ref 65–99)
GLUCOSE BLD-MCNC: 467 MG/DL (ref 65–99)
GLUCOSE BLD-MCNC: 488 MG/DL (ref 65–99)
GLUCOSE BLD-MCNC: 534 MG/DL (ref 65–99)
GLUCOSE BLD-MCNC: 555 MG/DL (ref 65–99)
GLUCOSE BLD-MCNC: 93 MG/DL (ref 65–99)
GLUCOSE BLD-MCNC: >600 MG/DL (ref 65–99)
GLUCOSE SERPL-MCNC: 137 MG/DL (ref 65–99)
GLUCOSE SERPL-MCNC: 190 MG/DL (ref 65–99)
GLUCOSE SERPL-MCNC: 207 MG/DL (ref 65–99)
GLUCOSE SERPL-MCNC: 618 MG/DL (ref 65–99)
GLUCOSE SERPL-MCNC: >1500 MG/DL (ref 65–99)
HBA1C MFR BLD: 11.3 % (ref 4–5.6)
HBV CORE AB SERPL QL IA: NONREACTIVE
HBV SURFACE AB SERPL IA-ACNC: 2042 MIU/ML (ref 0–10)
HBV SURFACE AG SER QL: NORMAL
HCT VFR BLD AUTO: 37.8 % (ref 42–52)
HCT VFR BLD AUTO: NORMAL % (ref 42–52)
HCV AB SER QL: NORMAL
HGB BLD-MCNC: 11.9 G/DL (ref 14–18)
HGB BLD-MCNC: NORMAL G/DL (ref 14–18)
IMM GRANULOCYTES # BLD AUTO: 0.1 K/UL (ref 0–0.11)
IMM GRANULOCYTES NFR BLD AUTO: 0.8 % (ref 0–0.9)
LACTATE BLD-SCNC: 4.3 MMOL/L (ref 0.5–2)
LYMPHOCYTES # BLD AUTO: 1.36 K/UL (ref 1–4.8)
LYMPHOCYTES NFR BLD: 10.7 % (ref 22–41)
MCH RBC QN AUTO: 27.6 PG (ref 27–33)
MCH RBC QN AUTO: NORMAL PG (ref 27–33)
MCHC RBC AUTO-ENTMCNC: 31.5 G/DL (ref 33.7–35.3)
MCHC RBC AUTO-ENTMCNC: NORMAL G/DL (ref 33.7–35.3)
MCV RBC AUTO: 87.7 FL (ref 81.4–97.8)
MCV RBC AUTO: NORMAL FL (ref 81.4–97.8)
MONOCYTES # BLD AUTO: 1.04 K/UL (ref 0–0.85)
MONOCYTES NFR BLD AUTO: 8.2 % (ref 0–13.4)
NEUTROPHILS # BLD AUTO: 10.2 K/UL (ref 1.82–7.42)
NEUTROPHILS NFR BLD: 80.2 % (ref 44–72)
NRBC # BLD AUTO: 0 K/UL
NRBC BLD-RTO: 0 /100 WBC
OSMOLALITY SERPL: 375 MOSM/KG H2O (ref 278–298)
OSMOLALITY SERPL: 423 MOSM/KG H2O (ref 278–298)
OSMOLALITY SERPL: 459 MOSM/KG H2O (ref 278–298)
PHOSPHATE SERPL-MCNC: 1.8 MG/DL (ref 2.5–4.5)
PHOSPHATE SERPL-MCNC: 4.1 MG/DL (ref 2.5–4.5)
PLATELET # BLD AUTO: 182 K/UL (ref 164–446)
PLATELET # BLD AUTO: NORMAL K/UL (ref 164–446)
PMV BLD AUTO: 10.8 FL (ref 9–12.9)
PMV BLD AUTO: NORMAL FL (ref 9–12.9)
POTASSIUM SERPL-SCNC: 3 MMOL/L (ref 3.6–5.5)
POTASSIUM SERPL-SCNC: 3.9 MMOL/L (ref 3.6–5.5)
POTASSIUM SERPL-SCNC: 4 MMOL/L (ref 3.6–5.5)
POTASSIUM SERPL-SCNC: 4.2 MMOL/L (ref 3.6–5.5)
POTASSIUM SERPL-SCNC: 4.7 MMOL/L (ref 3.6–5.5)
RBC # BLD AUTO: 4.31 M/UL (ref 4.7–6.1)
RBC # BLD AUTO: NORMAL M/UL (ref 4.7–6.1)
SODIUM SERPL-SCNC: 149 MMOL/L (ref 135–145)
SODIUM SERPL-SCNC: 152 MMOL/L (ref 135–145)
SODIUM SERPL-SCNC: 161 MMOL/L (ref 135–145)
SODIUM SERPL-SCNC: 166 MMOL/L (ref 135–145)
SODIUM SERPL-SCNC: 171 MMOL/L (ref 135–145)
TREPONEMA PALLIDUM IGG+IGM AB [PRESENCE] IN SERUM OR PLASMA BY IMMUNOASSAY: NORMAL
WBC # BLD AUTO: 12.7 K/UL (ref 4.8–10.8)
WBC # BLD AUTO: NORMAL K/UL (ref 4.8–10.8)

## 2021-07-21 PROCEDURE — 82962 GLUCOSE BLOOD TEST: CPT

## 2021-07-21 PROCEDURE — 700102 HCHG RX REV CODE 250 W/ 637 OVERRIDE(OP): Performed by: INTERNAL MEDICINE

## 2021-07-21 PROCEDURE — 87906 NFCT AGT GNTYP ALYS HIV1: CPT

## 2021-07-21 PROCEDURE — 86706 HEP B SURFACE ANTIBODY: CPT

## 2021-07-21 PROCEDURE — 700105 HCHG RX REV CODE 258: Performed by: INTERNAL MEDICINE

## 2021-07-21 PROCEDURE — 84100 ASSAY OF PHOSPHORUS: CPT | Mod: 91

## 2021-07-21 PROCEDURE — 05HY33Z INSERTION OF INFUSION DEVICE INTO UPPER VEIN, PERCUTANEOUS APPROACH: ICD-10-PCS | Performed by: INTERNAL MEDICINE

## 2021-07-21 PROCEDURE — 93010 ELECTROCARDIOGRAM REPORT: CPT | Performed by: INTERNAL MEDICINE

## 2021-07-21 PROCEDURE — 87901 NFCT AGT GNTYP ALYS HIV1 REV: CPT

## 2021-07-21 PROCEDURE — 83930 ASSAY OF BLOOD OSMOLALITY: CPT | Mod: 91

## 2021-07-21 PROCEDURE — B54NZZA ULTRASONOGRAPHY OF LEFT UPPER EXTREMITY VEINS, GUIDANCE: ICD-10-PCS | Performed by: INTERNAL MEDICINE

## 2021-07-21 PROCEDURE — 86777 TOXOPLASMA ANTIBODY: CPT

## 2021-07-21 PROCEDURE — 83036 HEMOGLOBIN GLYCOSYLATED A1C: CPT

## 2021-07-21 PROCEDURE — 86803 HEPATITIS C AB TEST: CPT

## 2021-07-21 PROCEDURE — 86708 HEPATITIS A ANTIBODY: CPT

## 2021-07-21 PROCEDURE — 86780 TREPONEMA PALLIDUM: CPT

## 2021-07-21 PROCEDURE — 99291 CRITICAL CARE FIRST HOUR: CPT | Performed by: INTERNAL MEDICINE

## 2021-07-21 PROCEDURE — 700111 HCHG RX REV CODE 636 W/ 250 OVERRIDE (IP): Performed by: INTERNAL MEDICINE

## 2021-07-21 PROCEDURE — 99223 1ST HOSP IP/OBS HIGH 75: CPT | Mod: GC | Performed by: INTERNAL MEDICINE

## 2021-07-21 PROCEDURE — 93005 ELECTROCARDIOGRAM TRACING: CPT | Performed by: INTERNAL MEDICINE

## 2021-07-21 PROCEDURE — 83605 ASSAY OF LACTIC ACID: CPT

## 2021-07-21 PROCEDURE — A9270 NON-COVERED ITEM OR SERVICE: HCPCS | Performed by: INTERNAL MEDICINE

## 2021-07-21 PROCEDURE — 369999 HCHG MISC LAB CHARGE

## 2021-07-21 PROCEDURE — 770022 HCHG ROOM/CARE - ICU (200)

## 2021-07-21 PROCEDURE — 87389 HIV-1 AG W/HIV-1&-2 AB AG IA: CPT

## 2021-07-21 PROCEDURE — 700101 HCHG RX REV CODE 250: Performed by: INTERNAL MEDICINE

## 2021-07-21 PROCEDURE — 87900 PHENOTYPE INFECT AGENT DRUG: CPT

## 2021-07-21 PROCEDURE — 87340 HEPATITIS B SURFACE AG IA: CPT

## 2021-07-21 PROCEDURE — 85027 COMPLETE CBC AUTOMATED: CPT

## 2021-07-21 PROCEDURE — 85025 COMPLETE CBC W/AUTO DIFF WBC: CPT

## 2021-07-21 PROCEDURE — 86704 HEP B CORE ANTIBODY TOTAL: CPT

## 2021-07-21 PROCEDURE — 76937 US GUIDE VASCULAR ACCESS: CPT

## 2021-07-21 PROCEDURE — 80048 BASIC METABOLIC PNL TOTAL CA: CPT

## 2021-07-21 PROCEDURE — 700101 HCHG RX REV CODE 250

## 2021-07-21 RX ORDER — METOPROLOL TARTRATE 1 MG/ML
INJECTION, SOLUTION INTRAVENOUS
Status: COMPLETED
Start: 2021-07-21 | End: 2021-07-21

## 2021-07-21 RX ORDER — POTASSIUM CHLORIDE 7.45 MG/ML
10 INJECTION INTRAVENOUS
Status: COMPLETED | OUTPATIENT
Start: 2021-07-21 | End: 2021-07-21

## 2021-07-21 RX ORDER — AMIODARONE HYDROCHLORIDE 150 MG/3ML
150 INJECTION, SOLUTION INTRAVENOUS ONCE
Status: DISCONTINUED | OUTPATIENT
Start: 2021-07-21 | End: 2021-07-21

## 2021-07-21 RX ORDER — DEXTROSE AND SODIUM CHLORIDE 10; .45 G/100ML; G/100ML
INJECTION, SOLUTION INTRAVENOUS CONTINUOUS
Status: DISCONTINUED | OUTPATIENT
Start: 2021-07-21 | End: 2021-07-21

## 2021-07-21 RX ORDER — DEXTROSE MONOHYDRATE 25 G/50ML
50 INJECTION, SOLUTION INTRAVENOUS
Status: DISCONTINUED | OUTPATIENT
Start: 2021-07-21 | End: 2021-07-22

## 2021-07-21 RX ORDER — METOPROLOL TARTRATE 1 MG/ML
5 INJECTION, SOLUTION INTRAVENOUS ONCE
Status: COMPLETED | OUTPATIENT
Start: 2021-07-21 | End: 2021-07-21

## 2021-07-21 RX ORDER — DEXTROSE MONOHYDRATE 50 MG/ML
INJECTION, SOLUTION INTRAVENOUS CONTINUOUS
Status: DISCONTINUED | OUTPATIENT
Start: 2021-07-21 | End: 2021-07-21

## 2021-07-21 RX ORDER — DEXTROSE MONOHYDRATE 50 MG/ML
INJECTION, SOLUTION INTRAVENOUS CONTINUOUS
Status: DISCONTINUED | OUTPATIENT
Start: 2021-07-21 | End: 2021-07-22

## 2021-07-21 RX ORDER — METOPROLOL TARTRATE 50 MG/1
50 TABLET, FILM COATED ORAL TWICE DAILY
Status: DISCONTINUED | OUTPATIENT
Start: 2021-07-21 | End: 2021-07-25 | Stop reason: HOSPADM

## 2021-07-21 RX ADMIN — AMIODARONE HYDROCHLORIDE 150 MG: 1.5 INJECTION, SOLUTION INTRAVENOUS at 23:06

## 2021-07-21 RX ADMIN — METOPROLOL TARTRATE 50 MG: 50 TABLET, FILM COATED ORAL at 23:15

## 2021-07-21 RX ADMIN — METOPROLOL TARTRATE 5 MG: 5 INJECTION INTRAVENOUS at 15:28

## 2021-07-21 RX ADMIN — HEPARIN SODIUM 5000 UNITS: 5000 INJECTION, SOLUTION INTRAVENOUS; SUBCUTANEOUS at 21:38

## 2021-07-21 RX ADMIN — DEXTROSE MONOHYDRATE: 50 INJECTION, SOLUTION INTRAVENOUS at 21:45

## 2021-07-21 RX ADMIN — SODIUM CHLORIDE 12 UNITS/HR: 9 INJECTION, SOLUTION INTRAVENOUS at 06:50

## 2021-07-21 RX ADMIN — DEXTROSE AND SODIUM CHLORIDE: 10; .45 INJECTION, SOLUTION INTRAVENOUS at 19:09

## 2021-07-21 RX ADMIN — DEXTROSE AND SODIUM CHLORIDE: 10; .45 INJECTION, SOLUTION INTRAVENOUS at 11:00

## 2021-07-21 RX ADMIN — DEXTROSE MONOHYDRATE: 50 INJECTION, SOLUTION INTRAVENOUS at 23:14

## 2021-07-21 RX ADMIN — POTASSIUM CHLORIDE 10 MEQ: 7.46 INJECTION, SOLUTION INTRAVENOUS at 12:42

## 2021-07-21 RX ADMIN — METOPROLOL TARTRATE 5 MG: 1 INJECTION, SOLUTION INTRAVENOUS at 22:44

## 2021-07-21 RX ADMIN — INSULIN GLARGINE 20 UNITS: 100 INJECTION, SOLUTION SUBCUTANEOUS at 21:52

## 2021-07-21 RX ADMIN — SODIUM CHLORIDE 12 UNITS/HR: 9 INJECTION, SOLUTION INTRAVENOUS at 17:24

## 2021-07-21 RX ADMIN — POTASSIUM CHLORIDE 10 MEQ: 7.46 INJECTION, SOLUTION INTRAVENOUS at 17:13

## 2021-07-21 RX ADMIN — BICTEGRAVIR SODIUM, EMTRICITABINE, AND TENOFOVIR ALAFENAMIDE FUMARATE 1 TABLET: 50; 200; 25 TABLET ORAL at 17:25

## 2021-07-21 RX ADMIN — AMIODARONE HYDROCHLORIDE 1 MG/MIN: 1.8 INJECTION, SOLUTION INTRAVENOUS at 23:27

## 2021-07-21 RX ADMIN — POTASSIUM CHLORIDE 10 MEQ: 7.46 INJECTION, SOLUTION INTRAVENOUS at 21:34

## 2021-07-21 RX ADMIN — SODIUM CHLORIDE 12 UNITS/HR: 9 INJECTION, SOLUTION INTRAVENOUS at 12:42

## 2021-07-21 RX ADMIN — POTASSIUM CHLORIDE 10 MEQ: 7.46 INJECTION, SOLUTION INTRAVENOUS at 15:50

## 2021-07-21 RX ADMIN — POTASSIUM CHLORIDE 10 MEQ: 7.46 INJECTION, SOLUTION INTRAVENOUS at 20:05

## 2021-07-21 RX ADMIN — SODIUM CHLORIDE, POTASSIUM CHLORIDE, SODIUM LACTATE AND CALCIUM CHLORIDE: 600; 310; 30; 20 INJECTION, SOLUTION INTRAVENOUS at 00:51

## 2021-07-21 RX ADMIN — HEPARIN SODIUM 5000 UNITS: 5000 INJECTION, SOLUTION INTRAVENOUS; SUBCUTANEOUS at 15:04

## 2021-07-21 RX ADMIN — POTASSIUM CHLORIDE 10 MEQ: 7.46 INJECTION, SOLUTION INTRAVENOUS at 11:38

## 2021-07-21 RX ADMIN — SODIUM CHLORIDE, POTASSIUM CHLORIDE, SODIUM LACTATE AND CALCIUM CHLORIDE: 600; 310; 30; 20 INJECTION, SOLUTION INTRAVENOUS at 05:52

## 2021-07-21 ASSESSMENT — ENCOUNTER SYMPTOMS
NAUSEA: 0
NECK PAIN: 0
COUGH: 0
VOMITING: 0
CHILLS: 0
MYALGIAS: 0
PALPITATIONS: 0
SPUTUM PRODUCTION: 0
HEMOPTYSIS: 0
FEVER: 0
ORTHOPNEA: 0
HEADACHES: 0
ABDOMINAL PAIN: 0

## 2021-07-21 ASSESSMENT — PAIN DESCRIPTION - PAIN TYPE
TYPE: ACUTE PAIN

## 2021-07-21 ASSESSMENT — FIBROSIS 4 INDEX: FIB4 SCORE: 0.61

## 2021-07-21 NOTE — CONSULTS
INFECTIOUS DISEASES INPATIENT CONSULT NOTE     Date of Service: 7/21/2021    Consult Requested By: Sidney Jarrett M.D.    Reason for Consultation: HIV recommendations.    History of Present Illness:   56-year-old male presented on 07/20/2021 with altered mental status.    Past medical history significant for HIV (possible medication noncompliance x 6 months), hypertension.    Per family, patient has had progressive confusion since roughly 07/17/2021.  They report witnessing him walk into walls and flipping bed mattresses but responding that he is fine.  They called EMS after confusion progressed to near unresponsiveness.  Per wife, patient has history over previous years of being thirsty and urinating frequently.  Family history is unclear as patient was born and raised in [place].  Per report, patient and wife lived in [place] until roughly 1-1/2 years ago.  He was seeing an infectious disease provider there who provided HAART medications per wife, patient was previously on Atripla.  He has not established a new provider since moving to [place].  Last CD4 count is unknown.  Wife reports that last viral load was undetectable when drawn while living in [place].    Patient was found to be in DKA upon arrival by ambulance to the emergency department, though he has never been previously diagnosed with diabetes.  Glucose was 1292.  Creatinine was also elevated to 4.51.  HIV and CD4 count were ordered.  Absolute lymphocyte count was 10.7.  He was started on aggressive fluids with insulin infusion for DKA treatment.     Pain all other review of systems reviewed and negative except those documented above in the HPI.     PMH:   Past Medical History:   Diagnosis Date   • HIV (human immunodeficiency virus infection) (Formerly [facility]) 2006   • Lung field abnormal finding on examination 7/20/2021       PSH:  History reviewed. No pertinent surgical history.    FAMILY HX:  History reviewed. No pertinent family history.    SOCIAL  HX:  Social History     Socioeconomic History   • Marital status:      Spouse name: Not on file   • Number of children: Not on file   • Years of education: Not on file   • Highest education level: Not on file   Occupational History   • Not on file   Tobacco Use   • Smoking status: Never Smoker   • Smokeless tobacco: Never Used   Substance and Sexual Activity   • Alcohol use: Not Currently   • Drug use: Never   • Sexual activity: Not on file   Other Topics Concern   • Not on file   Social History Narrative   • Not on file     Social Determinants of Health     Financial Resource Strain:    • Difficulty of Paying Living Expenses:    Food Insecurity:    • Worried About Running Out of Food in the Last Year:    • Ran Out of Food in the Last Year:    Transportation Needs:    • Lack of Transportation (Medical):    • Lack of Transportation (Non-Medical):    Physical Activity:    • Days of Exercise per Week:    • Minutes of Exercise per Session:    Stress:    • Feeling of Stress :    Social Connections:    • Frequency of Communication with Friends and Family:    • Frequency of Social Gatherings with Friends and Family:    • Attends Christianity Services:    • Active Member of Clubs or Organizations:    • Attends Club or Organization Meetings:    • Marital Status:    Intimate Partner Violence:    • Fear of Current or Ex-Partner:    • Emotionally Abused:    • Physically Abused:    • Sexually Abused:      Social History     Tobacco Use   Smoking Status Never Smoker   Smokeless Tobacco Never Used     Social History     Substance and Sexual Activity   Alcohol Use Not Currently       Allergies/Intolerances:  No Known Allergies    History reviewed with the patient? Yes.    Other Current Medications:    Current Facility-Administered Medications:   •  D10%-0.45% NaCl infusion, , Intravenous, Continuous, Luli Thomson M.D., Last Rate: 150 mL/hr at 07/21/21 1100, New Bag at 07/21/21 1100  •  aspirin EC (ECOTRIN) tablet 81 mg, 81  mg, Oral, DAILY, Stiven Champion M.D.  •  senna-docusate (PERICOLACE or SENOKOT S) 8.6-50 MG per tablet 2 tablet, 2 tablet, Oral, BID **AND** polyethylene glycol/lytes (MIRALAX) PACKET 1 Packet, 1 Packet, Oral, QDAY PRN **AND** [DISCONTINUED] magnesium hydroxide (MILK OF MAGNESIA) suspension 30 mL, 30 mL, Oral, QDAY PRN **AND** bisacodyl (DULCOLAX) suppository 10 mg, 10 mg, Rectal, QDAY PRN, Stiven Champion M.D.  •  heparin injection 5,000 Units, 5,000 Units, Subcutaneous, Q8HRS, Stiven Champion M.D.  •  ondansetron (ZOFRAN) syringe/vial injection 4 mg, 4 mg, Intravenous, Q4HRS PRN, Stiven Champion M.D.  •  ondansetron (ZOFRAN ODT) dispertab 4 mg, 4 mg, Oral, Q4HRS PRN, Stiven Champion M.D.  •  MD ALERT-PHARMACY TO CONSULT FOR DKA MONITORING 1 Each, 1 Each, Other, PRN, Luli Thomson M.D.  •  magnesium sulfate IVPB premix 2 g, 2 g, Intravenous, Once PRN **OR** magnesium sulfate IVPB premix 4 g, 4 g, Intravenous, Once PRN, Luli Thomson M.D.  •  potassium phosphate 30 mmol in  mL ivpb, 30 mmol, Intravenous, Once PRN **OR** sodium phosphate 30 mmol in 1/2  mL ivpb, 30 mmol, Intravenous, Once PRN, Luli Thomson M.D.  •  Adult DKA potassium(K+) replacement scale, 1 Each, Intravenous, Q4HRS, Luli Thomson M.D., 1 Each at 07/21/21 1400  •  [DISCONTINUED] lactated ringers infusion (BOLUS), 2,000 mL, Intravenous, Once **OR** NS (BOLUS) infusion 2,000 mL, 2,000 mL, Intravenous, Once, Luli Thomson M.D., Held at 07/20/21 1630  •  lactated ringers infusion, , Intravenous, Continuous, Luli Thomson M.D., Stopped at 07/21/21 0829  •  D5LR infusion, , Intravenous, Continuous, Luli Thomson M.D., Held at 07/20/21 1630  •  insulin regular human (HUMULIN/NOVOLIN R) 62.5 Units in  mL Infusion for DKA, 4 Units/hr, Intravenous, Continuous, Luli Thomson M.D., Last Rate: 48 mL/hr at 07/21/21 1400, 12 Units/hr at 07/21/21 1400  [unfilled]    Most Recent Vital Signs:  /95   Pulse  "87   Temp 36.7 °C (98 °F) (Temporal)   Resp (!) 25   Ht 1.753 m (5' 9\")   Wt 82.2 kg (181 lb 3.5 oz)   SpO2 92%   BMI 26.76 kg/m²   Temp  Av.7 °C (98 °F)  Min: 36.7 °C (98 °F)  Max: 36.7 °C (98 °F)    Physical Exam:  General: well nourished, no diaphoresis, well-appearing, no acute distress  HEENT: sclera anicteric, PERRL, extraocular muscles intact, mucous membranes moist, oropharynx clear and moist, no oral lesions or exudate  Neck: supple, no lymphadenopathy  Chest: CTAB, no rales, rhonchi or wheezes, normal work of breathing.  Cardiac: regular rate and rhythm, normal S1 S2, no murmurs, rubs or gallops  Abdomen: + bowel sounds, soft, non-tender, non-distended, no hepatosplenomegaly  Extremities: WWP, no edema, 2+ pedal pulses  Skin: warm and dry, no rashes or worrisome lesions  Neuro: Alert and oriented times 3, non-focal exam, speech fluent, full range of motion to bilateral upper and lower extremities  Psych: normal mood and behavior, pleasant; memory intact, normal judgement    Pertinent Lab Results:  Recent Labs     21  1315 21  0643 21  0830   WBC 11.6* RR 12.7*      Recent Labs     21  1315 21  0643 21  0830   HEMOGLOBIN 12.6* RR 11.9*   HEMATOCRIT 41.4* RR 37.8*   MCV 92.2 RR 87.7   MCH 28.1 RR 27.6   PLATELETCT 240          Recent Labs     21  0130 21  0643 21  0830   SODIUM 166* 152* 171*   POTASSIUM 4.7 4.2 3.9   CHLORIDE 127* 121* 131*   CO2 19* 14* 27   CREATININE 2.48* 1.04 1.98*        Recent Labs     21  1315   ALBUMIN 4.4        Pertinent Micro:  Results     Procedure Component Value Units Date/Time    BLOOD CULTURE [[id number]] Collected: 21 1244    Order Status: Completed Specimen: Blood from Peripheral Updated: 21 0725     Significant Indicator NEG     Source BLD     Site PERIPHERAL     Culture Result No Growth  Note: Blood cultures are incubated for 5 days and  are monitored continuously.Positive blood " "cultures  are called to the RN and reported as soon as  they are identified.      Narrative:      2 of 2 blood culture x2  Sites order. Per Hospital Policy:  Only change Specimen Src: to \"Line\" if specified by physician  order.  Left AC    URINALYSIS [[id number]]  (Abnormal) Collected: 07/20/21 1531    Order Status: Completed Specimen: Urine Updated: 07/20/21 1654     Color Yellow     Character Clear     Specific Gravity 1.026     Ph 5.0     Glucose >=1000 mg/dL      Ketones Trace mg/dL      Protein 30 mg/dL      Bilirubin Negative     Urobilinogen, Urine 0.2     Nitrite Negative     Leukocyte Esterase Negative     Occult Blood Moderate     Micro Urine Req Microscopic    URINALYSIS [[id number]]  (Abnormal) Collected: 07/20/21 1252    Order Status: Completed Specimen: Urine Updated: 07/20/21 1354     Color Yellow     Character Clear     Specific Gravity 1.026     Ph 5.0     Glucose >=1000 mg/dL      Ketones Trace mg/dL      Protein 30 mg/dL      Bilirubin Negative     Urobilinogen, Urine 0.2     Nitrite Negative     Leukocyte Esterase Negative     Occult Blood Small     Micro Urine Req Microscopic    Narrative:      Indication for culture:->Evaluation for sepsis without a  clear source of infection    URINE CULTURE(NEW) [[id number]] Collected: 07/20/21 1252    Order Status: Completed Specimen: Urine Updated: 07/20/21 1304    Narrative:      Indication for culture:->Evaluation for sepsis without a  clear source of infection    BLOOD CULTURE [[id number]]     Order Status: Sent Specimen: Blood from Peripheral         No results found for: BLOODCULTU, BLDCULT, BCHOLD     Studies:  CT-HEAD W/O    Result Date: 7/20/2021 7/20/2021 12:07 PM HISTORY/REASON FOR EXAM:  Mental status change, unknown cause. Altered level of consciousness.  Hyperglycemia. TECHNIQUE/EXAM DESCRIPTION AND NUMBER OF VIEWS: CT of the head without contrast. The study was performed on a helical multidetector CT scanner. Contiguous 2.5 mm axial sections were " obtained from the skull base through the vertex. Up to date radiation dose reduction adjustments have been utilized to meet ALARA standards for radiation dose reduction. COMPARISON:  None available FINDINGS: Lateral ventricles are mildly enlarged, but symmetric Cortical sulci are within normal limits. No significant mass effect or midline shift. Basal cisterns are patent. No evidence for intracranial hemorrhage. Calvaria are intact. Visualized orbits are unremarkable. Visualized mastoid air cells are clear. Visualized paranasal sinuses are unremarkable.     No acute intracranial abnormality.     DX-CHEST-PORTABLE (1 VIEW)    Result Date: 7/20/2021 7/20/2021 12:25 PM HISTORY/REASON FOR EXAM:  Fever. TECHNIQUE/EXAM DESCRIPTION AND NUMBER OF VIEWS: Single portable view of the chest. COMPARISON: None FINDINGS: Lungs: Symmetric low lung volumes. No focal opacities are present. No pleural effusion or pneumothorax. Mediastinum: The cardiac silhouette size is accentuated secondary to low lung volumes. Other: None.     No acute cardiac or pulmonary abnormalities are identified.    IR-MIDLINE CATHETER INSERTION WO GUIDANCE > AGE 3    Result Date: 7/21/2021  HISTORY/REASON FOR EXAM:  Midline Placement   TECHNIQUE/EXAM DESCRIPTION AND NUMBER OF VIEWS: Midline insertion with ultrasound guidance.  FINDINGS: Midline insertion with Ultrasound Guidance was performed by qualified nursing staff without the assistance of a Radiologist. Midline positioning as measured by RN or as appropriate length of catheter selected.              Ultrasound-guided midline placement performed by qualified nursing staff as above.       IMPRESSION:   1. Diabetic ketoacidosis, improving.    2. HIV, previously diagnosed. CD4 unknown.  3. Acute metabolic encephalopathy likely related to #1.  4. Acute renal failure.      PLAN:   Emperatriz Sheikh is a 56 y.o. male with history of HIV (with 6 months of medication noncompliance), who presented with altered  mental status was found to have diabetic ketoacidosis without a previous diagnosis of diabetes.  Altered status is likely related to DKA but given history of HIV, will continue to monitor for other sources and signs of infection or cause.    -CD4 count pending.  Follow results.  -Blood cultures showing no growth to date.  -No lumbar puncture at this time.  -Patient needs provider for management of HIV medications.  -[facility] is an option for patient to establish care.  Per family, unable to establish provider for HAART medications while here in [place].  -HIV genotype sent.  Sequence for resistance mutations.  -Will start on Biktarvy after HIV genotype screening has been drawn.    Plan of care discussed with CRUZITO Jarrett M.D.. Will continue to follow    Rome Sultana M.D.

## 2021-07-21 NOTE — PROGRESS NOTES
Bk from Lab called with critical result of blood glucose of 894 at 2015 which apparently is the result from ample sent at 1900. Critical lab result read back to Bk.   Dr. Thomson notified of critical lab result at 2100.  Critical lab result read back by Dr. Thomson.

## 2021-07-21 NOTE — ASSESSMENT & PLAN NOTE
-HIV quant and CD4 count sent  -He has been off HAART for 6 to 12 months.  He and his wife moved from [place] to [place] and he has not yet established care with an ID doctor to provide his meds  ID consulted

## 2021-07-21 NOTE — PROGRESS NOTES
Patient has significantly improving mental status.  Still confused, but much improved from prior.  Will not perform LP at this time. Low suspicion for CNS infection.

## 2021-07-21 NOTE — ASSESSMENT & PLAN NOTE
New diagnosis of diabetes  Labs consistent with a DKA/HHS overlap syndrome    Crystalloid resuscitation   Electrolyte replacement  Transitioned to subcutaneous insulin 7/21

## 2021-07-21 NOTE — ASSESSMENT & PLAN NOTE
Crackles on exam  May just be atalectesis, although he is active in bed  At risk for opportunistic infections  No O2 requirement and CXR is reassuring  Monitor clinically

## 2021-07-21 NOTE — PROGRESS NOTES
Jose Martin from Lab called with critical result of blood glucose 722 mg/dl at 2225. Critical lab result read back to Jose Martin.   Dr. Thomson notified of critical lab result at 225.  Critical lab result read back by Dr. Thomson.

## 2021-07-21 NOTE — ASSESSMENT & PLAN NOTE
Suspect hypovolemia due to osmotic diuresis  Aggressive IV fluids  Avoid nephrotoxins  UA reassuring

## 2021-07-21 NOTE — PROGRESS NOTES
Lab called to report lactic acid of 4.3. results read back to lab and confirmed. Dr. Jarrett aware of recollection of lab specimens.

## 2021-07-21 NOTE — PROGRESS NOTES
"Critical Care Progress Note    Date of admission  7/20/2021    Chief Complaint  \"56 y.o. male with h/o currently untreated HIV, HTN who presented 7/20/2021 with AMS.  History obtained from wife and stepdaughter. Patient has had progressive confusion and changes in mental status for 3 days.  He was walking into walls, but kept saying he was OK.  Today he became nearly unresponsive so was BIBA.  In the ER he was found to have significant metabolic disturbances including DKA with severe hyperglycemia and renal failure.  Other than the mental status change, he had been in his usual state of health without complaints.      Pt and wife moved from [place] about 1.5 years ago.  Patient spent 6 months in [place] visiting family before joining his wife (Di) in [place].  He had an ID provider in [place], but hasn't established care with anyone in [place] so doesn't have HAART meds.  Wife doesn't know what his last CD4 count was. Otherwise he has HTN but is otherwise healthy.  He works as a CNA.  His wife and step-daughter are CNA and RN respectively, both working at [employer].\"    Hospital Course  7/20 admitted to ICU  7/21 remains on insulin gtt    Interval Problem Update  Reviewed last 24 hour events:  Awake and alert  Insulin gtt  Improving Cr  Corrected Na 174 - decrease NaCl containing fluids and encourage free water intake  VTE ppx  Afebrile  SR-ST  MAP > 65 without pressors    Review of Systems  Review of Systems   Constitutional: Positive for malaise/fatigue. Negative for chills and fever.   HENT: Negative for hearing loss and tinnitus.    Respiratory: Negative for cough, hemoptysis and sputum production.    Cardiovascular: Negative for chest pain, palpitations, orthopnea and leg swelling.   Gastrointestinal: Negative for abdominal pain, nausea and vomiting.   Genitourinary: Positive for frequency. Negative for dysuria and urgency.   Musculoskeletal: Negative for myalgias and neck pain.   Neurological: Negative for " headaches.        Vital Signs for last 24 hours   Pulse:  [84-98] 87  Resp:  [19-33] 25  BP: (106-147)/() 147/95  SpO2:  [91 %-97 %] 92 %    Hemodynamic parameters for last 24 hours       Respiratory Information for the last 24 hours       Physical Exam   Physical Exam  Vitals and nursing note reviewed.   Constitutional:       General: He is not in acute distress.     Appearance: Normal appearance. He is ill-appearing.   HENT:      Head: Normocephalic and atraumatic.      Right Ear: External ear normal.      Left Ear: External ear normal.      Nose: Nose normal.      Mouth/Throat:      Mouth: Mucous membranes are moist.   Eyes:      Pupils: Pupils are equal, round, and reactive to light.   Cardiovascular:      Rate and Rhythm: Normal rate and regular rhythm.      Pulses: Normal pulses.      Heart sounds: No murmur heard.   No gallop.    Pulmonary:      Effort: Pulmonary effort is normal. No respiratory distress.      Breath sounds: No wheezing or rales.   Chest:      Chest wall: No tenderness.   Abdominal:      General: Abdomen is flat. There is no distension.      Palpations: Abdomen is soft.      Tenderness: There is no abdominal tenderness. There is no guarding or rebound.   Musculoskeletal:         General: No swelling, tenderness, deformity or signs of injury.      Cervical back: No rigidity.      Right lower leg: No edema.      Left lower leg: No edema.   Lymphadenopathy:      Cervical: No cervical adenopathy.   Skin:     General: Skin is warm and dry.      Capillary Refill: Capillary refill takes less than 2 seconds.   Neurological:      General: No focal deficit present.      Mental Status: He is alert and oriented to person, place, and time.      Motor: No weakness.   Psychiatric:         Mood and Affect: Mood normal.         Medications  Current Facility-Administered Medications   Medication Dose Route Frequency Provider Last Rate Last Admin   • D10%-0.45% NaCl infusion   Intravenous Continuous  Luli Thomson M.D. 150 mL/hr at 07/21/21 1100 New Bag at 07/21/21 1100   • aspirin EC (ECOTRIN) tablet 81 mg  81 mg Oral DAILY Stiven Champion M.D.       • senna-docusate (PERICOLACE or SENOKOT S) 8.6-50 MG per tablet 2 tablet  2 tablet Oral BID Stiven Champion M.D.        And   • polyethylene glycol/lytes (MIRALAX) PACKET 1 Packet  1 Packet Oral QDAY PRN Stiven Champion M.D.        And   • bisacodyl (DULCOLAX) suppository 10 mg  10 mg Rectal QDAY PRN Stiven Champion M.D.       • heparin injection 5,000 Units  5,000 Units Subcutaneous Q8HRS Stiven Champion M.D.       • ondansetron (ZOFRAN) syringe/vial injection 4 mg  4 mg Intravenous Q4HRS PRN Stiven Champion M.D.       • ondansetron (ZOFRAN ODT) dispertab 4 mg  4 mg Oral Q4HRS PRN Stiven Champion M.D.       • MD ALERT-PHARMACY TO CONSULT FOR DKA MONITORING 1 Each  1 Each Other PRN Luli Thomson M.D.       • magnesium sulfate IVPB premix 2 g  2 g Intravenous Once PRN Luli Thomson M.D.        Or   • magnesium sulfate IVPB premix 4 g  4 g Intravenous Once PRN Luli Thomson M.D.       • potassium phosphate 30 mmol in  mL ivpb  30 mmol Intravenous Once PRN Luli Thomson M.D.        Or   • sodium phosphate 30 mmol in 1/2  mL ivpb  30 mmol Intravenous Once PRN Luli Thomson M.D.       • Adult DKA potassium(K+) replacement scale  1 Each Intravenous Q4HRS Luli Thomson M.D.   1 Each at 07/21/21 1000   • NS (BOLUS) infusion 2,000 mL  2,000 mL Intravenous Once Luli Thomson M.D.   Held at 07/20/21 1630   • lactated ringers infusion   Intravenous Continuous Luli Thomson M.D.   Stopped at 07/21/21 0829   • D5LR infusion   Intravenous Continuous Luli E Berto, M.D.   Held at 07/20/21 1630   • insulin regular human (HUMULIN/NOVOLIN R) 62.5 Units in  mL Infusion for DKA  4 Units/hr Intravenous Continuous Luli Thomson M.D. 48 mL/hr at 07/21/21 1304 12 Units/hr at 07/21/21 1304       Fluids    Intake/Output Summary (Last 24  hours) at 7/21/2021 1421  Last data filed at 7/21/2021 1200  Gross per 24 hour   Intake 8059.2 ml   Output 4000 ml   Net 4059.2 ml       Laboratory          Recent Labs     07/20/21  1315 07/20/21  1551 07/20/21  1730 07/20/21  2125 07/21/21  0130 07/21/21  0643 07/21/21  0830   SODIUM   < >  --  154*   < > 166* 152* 171*   POTASSIUM   < >  --  5.7*   < > 4.7 4.2 3.9   CHLORIDE   < >  --  113*   < > 127* 121* 131*   CO2   < >  --  13*   < > 19* 14* 27   BUN   < >  --  76*   < > 61* 30* 45*   CREATININE   < >  --  3.62*   < > 2.48* 1.04 1.98*   MAGNESIUM  --  4.0*  --   --   --   --   --    PHOSPHORUS  --  7.4* 7.3*  --  4.1  --   --    CALCIUM   < >  --  7.9*   < > 8.8 7.2* 8.7    < > = values in this interval not displayed.     Recent Labs     07/20/21 1315 07/20/21  1551 07/21/21  0130 07/21/21  0643 07/21/21  0830   ALTSGPT 32  --   --   --   --    ASTSGOT 15  --   --   --   --    ALKPHOSPHAT 117*  --   --   --   --    TBILIRUBIN 0.3  --   --   --   --    GLUCOSE 1292*   < > 618* 190* 207*    < > = values in this interval not displayed.     Recent Labs     07/20/21 1315 07/21/21  0643 07/21/21  0830   WBC 11.6* RR 12.7*   NEUTSPOLYS 81.90*  --  80.20*   LYMPHOCYTES 7.90*  --  10.70*   MONOCYTES 9.50  --  8.20   EOSINOPHILS 0.00  --  0.00   BASOPHILS 0.30  --  0.10   ASTSGOT 15  --   --    ALTSGPT 32  --   --    ALKPHOSPHAT 117*  --   --    TBILIRUBIN 0.3  --   --      Recent Labs     07/20/21 1315 07/21/21  0643 07/21/21  0830   RBC 4.49* RR 4.31*   HEMOGLOBIN 12.6* RR 11.9*   HEMATOCRIT 41.4* RR 37.8*   PLATELETCT 240    PROTHROMBTM 16.5*  --   --    INR 1.37*  --   --        Imaging  X-Ray:  I have personally reviewed the images and compared with prior images.    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    DKA protocol insulin infusion  Q1 hour blood glucose and q4 hour BMP  Crystalloid  resuscitation   Electrolyte replacement  Transition to subcutaneous insulin when bicarb > 17 and AG < 12      Essential hypertension  Assessment & Plan  Reintroduce oral antihypertensives when clinically appropriate     Acute metabolic encephalopathy  Assessment & Plan  - Toxic metabolic from DKA/HHS  - Improving    Hypernatremia  Assessment & Plan  Corrected sodium was 163 now 174  Query DI  Exchange IVF to decrease NaCl burden  Allow and encourage FW intake    HIV positive ([facility])  Assessment & Plan  -HIV quant and CD4 count sent  -He has been off HAART for 6 to 12 months.  He and his wife moved from [place] to [place] and he has not yet established care with an ID doctor to provide his meds  ID consulted    Acute renal failure (ARF) ([facility]ly [facility])  Assessment & Plan  Suspect hypovolemia due to osmotic diuresis  Aggressive IV fluids  Avoid nephrotoxins  UA reassuring       VTE:  Heparin  Ulcer: Not Indicated  Lines: Arterial Line  ok to remove    I have performed a physical exam and reviewed and updated ROS and Plan today (7/21/2021). In review of yesterday's note (7/20/2021), there are no changes except as documented above.     Discussed patient condition and risk of morbidity and/or mortality with Hospitalist, Family, RN, RT, Pharmacy, Charge nurse / hot rounds and Patient  The patient remains critically ill.  Critical care time = 45 minutes in directly providing and coordinating critical care and extensive data review.  No time overlap and excludes procedures.

## 2021-07-21 NOTE — PROGRESS NOTES
Lab called to requested a recollect for BMP because the current sample shows a K level of 2.4. A new sample was collected and sent to lab.  Patient converted to afib around 1440 HR 120s-130s. BP wnl.   Dr. Jarrett made aware. New order for metoprolol 5mg IV and 20 meq KCL received.   Orders read back, confirmed and administered.

## 2021-07-21 NOTE — PROCEDURES
"Arterial Line Insertion    Date/Time: 7/20/2021 9:05 PM  Performed by: Luli Thomson M.D.  Authorized by: Luli Thomson M.D.   Consent: Verbal consent obtained. Written consent obtained.  Risks and benefits: risks, benefits and alternatives were discussed  Consent given by: spouse  Patient identity confirmed: arm band  Time out: Immediately prior to procedure a \"time out\" was called to verify the correct patient, procedure, equipment, support staff and site/side marked as required.  Preparation: Patient was prepped and draped in the usual sterile fashion.  Indications comments: need for very q1h lab draws  Location: left radial    Anesthesia:  Local Anesthetic: lidocaine 1% without epinephrine    Sedation:  Patient sedated: no    Needle gauge: 20  Seldinger technique: Seldinger technique used  Number of attempts: 1  Post-procedure: line sutured  Post-procedure CMS: unchanged  Patient tolerance: Patient tolerated the procedure well with no immediate complications              "

## 2021-07-21 NOTE — CONSULTS
Critical Care Consultation    Date of consult: 7/20/2021    Referring Physician  Luli Thomson M.D.    Reason for Consultation  DKA    History of Presenting Illness  56 y.o. male with h/o currently untreated HIV, HTN who presented 7/20/2021 with AMS.  History obtained from wife and stepdaughter. Patient has had progressive confusion and changes in mental status for 3 days.  He was walking into walls, but kept saying he was OK.  Today he became nearly unresponsive so was BIBA.  In the ER he was found to have significant metabolic disturbances including DKA with severe hyperglycemia and renal failure.  Other than the mental status change, he had been in his usual state of health without complaints.     Pt and wife moved from [place] about 1.5 years ago.  Patient spent 6 months in [place] visiting family before joining his wife (Di) in [place].  He had an ID provider in [place], but hasn't established care with anyone in [place] so doesn't have HAART meds.  Wife doesn't know what his last CD4 count was. Otherwise he has HTN but is otherwise healthy.  He works as a CNA.  His wife and step-daughter are CNA and RN respectively, both working at [employer].     Code Status  Full Code    Review of Systems  Review of Systems   Unable to perform ROS: Mental status change       Past Medical History   has a past medical history of HIV (human immunodeficiency virus infection) ([facility]) (2006).    Surgical History   has no past surgical history on file.    Family History  family history is not on file.    Social History   reports that he has never smoked. He has never used smokeless tobacco. He reports previous alcohol use. He reports that he does not use drugs.    Medications  Home Medications     Reviewed by Liza Diallo (Pharmacy Tech) on 07/20/21 at 1258  Med List Status: Complete   Medication Last Dose Status   aspirin EC (ECOTRIN) 81 MG Tablet Delayed Response 7/18/2021 Active   multivitamin (THERAGRAN) Tab 7/18/2021  Active              Current Facility-Administered Medications   Medication Dose Route Frequency Provider Last Rate Last Admin   • [START ON 7/21/2021] aspirin EC (ECOTRIN) tablet 81 mg  81 mg Oral DAILY Stiven Champion M.D.       • [START ON 7/21/2021] multivitamin (THERAGRAN) tablet 1 tablet  1 tablet Oral DAILY Stiven Champion M.D.       • [START ON 7/21/2021] senna-docusate (PERICOLACE or SENOKOT S) 8.6-50 MG per tablet 2 tablet  2 tablet Oral BID Stiven Champion M.D.        And   • polyethylene glycol/lytes (MIRALAX) PACKET 1 Packet  1 Packet Oral QDAY PRN Stiven Champion M.D.        And   • bisacodyl (DULCOLAX) suppository 10 mg  10 mg Rectal QDAY PRN Stiven Champion M.D.       • [START ON 7/21/2021] heparin injection 5,000 Units  5,000 Units Subcutaneous Q8HRS Stiven Champion M.D.       • ondansetron (ZOFRAN) syringe/vial injection 4 mg  4 mg Intravenous Q4HRS PRN Stiven Champion M.D.       • ondansetron (ZOFRAN ODT) dispertab 4 mg  4 mg Oral Q4HRS PRN Stiven Champion M.D.       • MD ALERT-PHARMACY TO CONSULT FOR DKA MONITORING 1 Each  1 Each Other PRN Luli Thomson M.D.       • magnesium sulfate IVPB premix 2 g  2 g Intravenous Once PRN Luli Thomson M.D.        Or   • magnesium sulfate IVPB premix 4 g  4 g Intravenous Once PRN Luli Thomson M.D.       • potassium phosphate 30 mmol in  mL ivpb  30 mmol Intravenous Once PRN Luli Thomson M.D.        Or   • sodium phosphate 30 mmol in 1/2  mL ivpb  30 mmol Intravenous Once PRN Luli Thomson M.D.       • Adult DKA potassium(K+) replacement scale  1 Each Intravenous Q4HRS Luli Thomson M.D.       • NS (BOLUS) infusion 2,000 mL  2,000 mL Intravenous Once Luli Thomson M.D.   Held at 07/20/21 1630   • lactated ringers infusion   Intravenous Continuous Luli Thomson M.D. 250 mL/hr at 07/20/21 1711 New Bag at 07/20/21 1711   • D5LR infusion   Intravenous Continuous Luli Thomson M.D.   Held at 07/20/21 1630   • insulin  regular human (HUMULIN/NOVOLIN R) 62.5 Units in  mL Infusion for DKA  4 Units/hr Intravenous Continuous Luli Thomson M.D. 16 mL/hr at 07/20/21 1715 4 Units/hr at 07/20/21 1715       Allergies  No Known Allergies    Vital Signs last 24 hours  Temp:  [36.7 °C (98 °F)] 36.7 °C (98 °F)  Pulse:  [] 90  Resp:  [22-41] 22  BP: ()/(62-76) 106/76  SpO2:  [94 %-97 %] 97 %    Physical Exam  Physical Exam  Constitutional:       General: He is not in acute distress.     Appearance: He is ill-appearing.   HENT:      Head: Normocephalic and atraumatic.      Mouth/Throat:      Mouth: Mucous membranes are dry.   Eyes:      Pupils: Pupils are equal, round, and reactive to light.   Cardiovascular:      Rate and Rhythm: Regular rhythm. Tachycardia present.      Pulses: Normal pulses.      Heart sounds: Normal heart sounds.   Pulmonary:      Effort: Pulmonary effort is normal.      Breath sounds: Rales (bibasilar) present.   Abdominal:      General: Bowel sounds are normal. There is distension.      Palpations: Abdomen is soft.      Tenderness: There is abdominal tenderness (mild diffuse).   Musculoskeletal:      Cervical back: No rigidity.      Right lower leg: No edema.      Left lower leg: No edema.   Skin:     General: Skin is warm and dry.   Neurological:      General: No focal deficit present.      Mental Status: He is alert.      Comments: Confused, perseverates on his name when asked but not answering other questions.  Not following for a neuro exam, but very strong throughout when trying to get out of bed         Fluids    Intake/Output Summary (Last 24 hours) at 7/20/2021 1840  Last data filed at 7/20/2021 1800  Gross per 24 hour   Intake 1000 ml   Output 1450 ml   Net -450 ml       Laboratory  Recent Results (from the past 48 hour(s))   VENOUS BLOOD GAS    Collection Time: 07/20/21 12:08 PM   Result Value Ref Range    Venous Bg Ph 7.23 (L) 7.31 - 7.45    Venous Bg Pco2 34.4 (L) 41.0 - 51.0 mmHg    Venous  Bg Po2 79.3 (H) 25.0 - 40.0 mmHg    Venous Bg O2 Saturation 90.2 %    Venous Bg Hco3 14 (L) 24 - 28 mmol/L    Venous Bg Base Excess -13 mmol/L    Body Temp see below Centigrade   LACTIC ACID    Collection Time: 07/20/21 12:08 PM   Result Value Ref Range    Lactic Acid 3.6 (H) 0.5 - 2.0 mmol/L   URINALYSIS    Collection Time: 07/20/21 12:52 PM    Specimen: Urine   Result Value Ref Range    Color Yellow     Character Clear     Specific Gravity 1.026 <1.035    Ph 5.0 5.0 - 8.0    Glucose >=1000 (A) Negative mg/dL    Ketones Trace (A) Negative mg/dL    Protein 30 (A) Negative mg/dL    Bilirubin Negative Negative    Urobilinogen, Urine 0.2 Negative    Nitrite Negative Negative    Leukocyte Esterase Negative Negative    Occult Blood Small (A) Negative    Micro Urine Req Microscopic    URINE MICROSCOPIC (W/UA)    Collection Time: 07/20/21 12:52 PM   Result Value Ref Range    WBC 2-5 (A) /hpf    Bacteria Few (A) None /hpf    Epithelial Cells Rare /hpf    Epithelial Cells Renal Rare /hpf    Mucous Threads Rare /hpf    Hyaline Cast 0-2 /lpf   CBC WITH DIFFERENTIAL    Collection Time: 07/20/21  1:15 PM   Result Value Ref Range    WBC 11.6 (H) 4.8 - 10.8 K/uL    RBC 4.49 (L) 4.70 - 6.10 M/uL    Hemoglobin 12.6 (L) 14.0 - 18.0 g/dL    Hematocrit 41.4 (L) 42.0 - 52.0 %    MCV 92.2 81.4 - 97.8 fL    MCH 28.1 27.0 - 33.0 pg    MCHC 30.4 (L) 33.7 - 35.3 g/dL    RDW 51.3 (H) 35.9 - 50.0 fL    Platelet Count 240 164 - 446 K/uL    MPV 11.5 9.0 - 12.9 fL    Neutrophils-Polys 81.90 (H) 44.00 - 72.00 %    Lymphocytes 7.90 (L) 22.00 - 41.00 %    Monocytes 9.50 0.00 - 13.40 %    Eosinophils 0.00 0.00 - 6.90 %    Basophils 0.30 0.00 - 1.80 %    Immature Granulocytes 0.40 0.00 - 0.90 %    Nucleated RBC 0.00 /100 WBC    Neutrophils (Absolute) 9.46 (H) 1.82 - 7.42 K/uL    Lymphs (Absolute) 0.91 (L) 1.00 - 4.80 K/uL    Monos (Absolute) 1.10 (H) 0.00 - 0.85 K/uL    Eos (Absolute) 0.00 0.00 - 0.51 K/uL    Baso (Absolute) 0.03 0.00 - 0.12 K/uL     Immature Granulocytes (abs) 0.05 0.00 - 0.11 K/uL    NRBC (Absolute) 0.00 K/uL   COMP METABOLIC PANEL    Collection Time: 07/20/21  1:15 PM   Result Value Ref Range    Sodium 145 135 - 145 mmol/L    Potassium 6.3 (H) 3.6 - 5.5 mmol/L    Chloride 100 96 - 112 mmol/L    Co2 16 (L) 20 - 33 mmol/L    Anion Gap 29.0 (H) 7.0 - 16.0    Glucose 1292 (HH) 65 - 99 mg/dL    Bun 86 (HH) 8 - 22 mg/dL    Creatinine 4.51 (H) 0.50 - 1.40 mg/dL    Calcium 8.5 8.5 - 10.5 mg/dL    AST(SGOT) 15 12 - 45 U/L    ALT(SGPT) 32 2 - 50 U/L    Alkaline Phosphatase 117 (H) 30 - 99 U/L    Total Bilirubin 0.3 0.1 - 1.5 mg/dL    Albumin 4.4 3.2 - 4.9 g/dL    Total Protein 8.7 (H) 6.0 - 8.2 g/dL    Globulin 4.3 (H) 1.9 - 3.5 g/dL    A-G Ratio 1.0 g/dL   TROPONIN    Collection Time: 07/20/21  1:15 PM   Result Value Ref Range    Troponin T 58 (H) 6 - 19 ng/L   BETA-HYDROXYBUTYRIC ACID    Collection Time: 07/20/21  1:15 PM   Result Value Ref Range    beta-Hydroxybutyric Acid >8.00 (H) 0.02 - 0.27 mmol/L   PT/INR    Collection Time: 07/20/21  1:15 PM   Result Value Ref Range    PT 16.5 (H) 12.0 - 14.6 sec    INR 1.37 (H) 0.87 - 1.13   ESTIMATED GFR    Collection Time: 07/20/21  1:15 PM   Result Value Ref Range    GFR If  16 (A) >60 mL/min/1.73 m 2    GFR If Non  14 (A) >60 mL/min/1.73 m 2   URINE SODIUM RANDOM    Collection Time: 07/20/21  3:31 PM   Result Value Ref Range    Sodium, Urine -per volume <20 mmol/L   URINE POTASSIUM RANDOM    Collection Time: 07/20/21  3:31 PM   Result Value Ref Range    Potassium 31.0 mmol/L   URINE CHLORIDE RANDOM    Collection Time: 07/20/21  3:31 PM   Result Value Ref Range    Chloride, Urine-per volume <20 mmol/L   URINE CREATININE RANDOM    Collection Time: 07/20/21  3:31 PM   Result Value Ref Range    Creatinine, Random Urine 42.44 mg/dL   URINALYSIS    Collection Time: 07/20/21  3:31 PM    Specimen: Urine   Result Value Ref Range    Color Yellow     Character Clear     Specific  Gravity 1.026 <1.035    Ph 5.0 5.0 - 8.0    Glucose >=1000 (A) Negative mg/dL    Ketones Trace (A) Negative mg/dL    Protein 30 (A) Negative mg/dL    Bilirubin Negative Negative    Urobilinogen, Urine 0.2 Negative    Nitrite Negative Negative    Leukocyte Esterase Negative Negative    Occult Blood Moderate (A) Negative    Micro Urine Req Microscopic    URINE MICROSCOPIC (W/UA)    Collection Time: 07/20/21  3:31 PM   Result Value Ref Range    WBC 2-5 (A) /hpf    RBC 0-2 (A) /hpf    Bacteria Rare (A) None /hpf    Epithelial Cells Rare /hpf    Mucous Threads Rare /hpf    Hyaline Cast 0-2 /lpf   LACTIC ACID    Collection Time: 07/20/21  3:51 PM   Result Value Ref Range    Lactic Acid 2.2 (H) 0.5 - 2.0 mmol/L   Magnesium    Collection Time: 07/20/21  3:51 PM   Result Value Ref Range    Magnesium 4.0 (H) 1.5 - 2.5 mg/dL   Phosphorus    Collection Time: 07/20/21  3:51 PM   Result Value Ref Range    Phosphorus 7.4 (H) 2.5 - 4.5 mg/dL   Blood Glucose    Collection Time: 07/20/21  3:51 PM   Result Value Ref Range    Glucose 1096 (HH) 65 - 99 mg/dL       Imaging  DX-CHEST-PORTABLE (1 VIEW)   Final Result      No acute cardiac or pulmonary abnormalities are identified.      CT-HEAD W/O   Final Result      No acute intracranial abnormality.             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  If mental status fails to improve with improvement in his metabolic state, will perform LP. This plan was discussed with Dr. Leon who is in agreement.  There is no meningismus, nuchal rigidity or fever, so I don't feel  empiric coverage of meningitis is indicated with a clear alternate cause of his encephalopathy.  -May require mitts/restraints as he tries to get out of bed and is not redirectable    Acute renal failure (ARF) ([facility])  Assessment & Plan  Suspect hypovolemia due to osmotic diuresis  Aggressive IV fluids  Avoid nephrotoxins  UA reassuring    Lung field abnormal finding on examination  Assessment & Plan  Crackles on exam  May just be atalectesis, although he is active in bed  At risk for opportunistic infections  No O2 requirement and CXR is reassuring  Monitor clinically    Essential hypertension  Assessment & Plan  Has been off meds  Soft BP here    Hypernatremia  Assessment & Plan  Corrected sodium is 163  Aggressive IV fluids  q4h osm      HIV positive ([facility])  Assessment & Plan  -HIV quant and CD4 count sent  -He has been off HAART for 6 to 12 months.  He and his wife moved from [place] to [place] and he has not yet established care with an ID doctor to provide his meds  -Dr. Leon will consult in the morning.  I discussed the case with him  -Absolute lymphocyte count is low so I suspect his CD4 count will be low        Discussed patient condition and risk of morbidity and/or mortality with Hospitalist, Family, RN, Patient and infectious disease.    The patient remains critically ill on insulin infusion.  Critical care time = 65 minutes in directly providing and coordinating critical care and extensive data review.  No time overlap and excludes procedures.

## 2021-07-21 NOTE — CARE PLAN
The patient is Watcher - Medium risk of patient condition declining or worsening    Shift Goals  Clinical Goals: control blood sugar,keep safety, maintain hemodnamically stable  Patient Goals: na  Family Goals: sugar control, rest and comfort    Progress made toward(s) clinical / shift goals:   Problem: Pain - Standard  Goal: Alleviation of pain or a reduction in pain to the patient’s comfort goal  Outcome: Progressing     Problem: Skin Integrity  Goal: Skin integrity is maintained or improved  Outcome: Progressing     Problem: Safety - Medical Restraint  Goal: Remains free of injury from restraints (Restraint for Interference with Medical Device)  Outcome: Progressing       Patient is not progressing towards the following goals:      Problem: Knowledge Deficit - Standard  Goal: Patient and family/care givers will demonstrate understanding of plan of care, disease process/condition, diagnostic tests and medications  Outcome: Not Progressing    Note: Patient remains AOX1, with episodes of restlessness in between.

## 2021-07-21 NOTE — PROGRESS NOTES
Bk from Lab called with critical result of Blood glucose 1001 at 2000. Critical lab result read back to Bk Thomson notified of critical lab result at 2000.  Critical lab result read back by Dr. Thomson.

## 2021-07-21 NOTE — PROGRESS NOTES
4 Eyes Skin Assessment Completed by Princess MENENDEZ RN and JENNIFER Patiño.    Head WDL  Ears WDL  Nose WDL  Mouth WDL  Neck WDL  Breast/Chest WDL  Shoulder Blades WDL  Spine WDL  (R) Arm/Elbow/Hand WDL  (L) Arm/Elbow/Hand WDL  Abdomen WDL  Groin WDL  Scrotum/Coccyx/Buttocks WDL  (R) Leg WDL  (L) Leg WDL  (R) Heel/Foot/Toe WDL  (L) Heel/Foot/Toe WDL          Devices In Places Blood Pressure Cuff and Pulse Ox      Interventions In Place Pillows and Low Air Loss Mattress    Possible Skin Injury No    Pictures Uploaded Into Epic N/A  Wound Consult Placed N/A  RN Wound Prevention Protocol Ordered No

## 2021-07-21 NOTE — PROCEDURES
Vascular Access Team    Date of Insertion: 7/21/2021  Arm Circumference: n/a  Line Length: 10cm  Line Size: 18g  Vein Occupancy %: 30  Reason for Midline: access  Labs: WBC 5.7, PLT 82, BUN 30, Cr 1.04, GFR >60, INR 1.37    Orders confirmed, vessel patency confirmed with ultrasound. Risks and benefits of procedure explained to patient and education regarding line associated bloodstream infections provided. Questions answered.     PowerGlide Midline placed in LUE per licensed provider order with ultrasound guidance. 18g, 10 cm line placed in brachial vein after 1 attempt(s).  Catheter inserted with brisk blood return. Secured with 0cm external from insertion site.  Line flushed without resistance with 10 mL 0.9% normal saline.  Midline secured with Biopatch and Tegaderm.     Midline placement is confirmed by nurse using ultrasound and ability to flush and draw blood. Midline is appropriate for use at this time.  No X-ray is needed for placement confirmation. Pt tolerated procedure well.  Patient condition relayed to unit RN or ordering physician via this post procedure note in the EMR.    Ultrasound images uploaded to PACS and viewable in the EMR - yes  Ultrasound imaged printed and placed in paper chart - no      BARD PowerGlide Midline ref # [id number], Lot # [id number], Expiration Date 3/31/2022

## 2021-07-22 ENCOUNTER — APPOINTMENT (OUTPATIENT)
Dept: CARDIOLOGY | Facility: MEDICAL CENTER | Age: 56
DRG: 637 | End: 2021-07-22
Attending: INTERNAL MEDICINE
Payer: COMMERCIAL

## 2021-07-22 PROBLEM — I48.91 ATRIAL FIBRILLATION (HCC): Status: ACTIVE | Noted: 2021-07-22

## 2021-07-22 LAB
ANION GAP SERPL CALC-SCNC: 11 MMOL/L (ref 7–16)
ANION GAP SERPL CALC-SCNC: 11 MMOL/L (ref 7–16)
ANION GAP SERPL CALC-SCNC: 8 MMOL/L (ref 7–16)
ANION GAP SERPL CALC-SCNC: 9 MMOL/L (ref 7–16)
ANNOTATION COMMENT IMP: ABNORMAL
BUN SERPL-MCNC: 25 MG/DL (ref 8–22)
BUN SERPL-MCNC: 26 MG/DL (ref 8–22)
BUN SERPL-MCNC: 28 MG/DL (ref 8–22)
BUN SERPL-MCNC: 29 MG/DL (ref 8–22)
CALCIUM SERPL-MCNC: 8 MG/DL (ref 8.5–10.5)
CALCIUM SERPL-MCNC: 8.1 MG/DL (ref 8.5–10.5)
CALCIUM SERPL-MCNC: 8.3 MG/DL (ref 8.5–10.5)
CALCIUM SERPL-MCNC: 8.4 MG/DL (ref 8.5–10.5)
CD3 CELLS # BLD: 833 CELLS/UL (ref 570–2400)
CD3+CD4+ CELLS # BLD: 340 CELLS/UL (ref 430–1800)
CD3+CD4+ CELLS/CD3+CD8+ CLL BLD: 0.72 RATIO (ref 0.8–3.9)
CD3+CD8+ CELLS # BLD: 473 CELLS/UL (ref 210–1200)
CHLORIDE SERPL-SCNC: 121 MMOL/L (ref 96–112)
CHLORIDE SERPL-SCNC: 123 MMOL/L (ref 96–112)
CHLORIDE SERPL-SCNC: 125 MMOL/L (ref 96–112)
CHLORIDE SERPL-SCNC: 125 MMOL/L (ref 96–112)
CO2 SERPL-SCNC: 22 MMOL/L (ref 20–33)
CO2 SERPL-SCNC: 23 MMOL/L (ref 20–33)
CO2 SERPL-SCNC: 25 MMOL/L (ref 20–33)
CO2 SERPL-SCNC: 26 MMOL/L (ref 20–33)
CREAT SERPL-MCNC: 1.7 MG/DL (ref 0.5–1.4)
CREAT SERPL-MCNC: 1.71 MG/DL (ref 0.5–1.4)
CREAT SERPL-MCNC: 1.84 MG/DL (ref 0.5–1.4)
CREAT SERPL-MCNC: 1.89 MG/DL (ref 0.5–1.4)
EKG IMPRESSION: NORMAL
GLUCOSE BLD-MCNC: 249 MG/DL (ref 65–99)
GLUCOSE BLD-MCNC: 347 MG/DL (ref 65–99)
GLUCOSE BLD-MCNC: 365 MG/DL (ref 65–99)
GLUCOSE BLD-MCNC: 415 MG/DL (ref 65–99)
GLUCOSE BLD-MCNC: 426 MG/DL (ref 65–99)
GLUCOSE SERPL-MCNC: 272 MG/DL (ref 65–99)
GLUCOSE SERPL-MCNC: 393 MG/DL (ref 65–99)
GLUCOSE SERPL-MCNC: 413 MG/DL (ref 65–99)
GLUCOSE SERPL-MCNC: 474 MG/DL (ref 65–99)
HIV 1+2 AB+HIV1 P24 AG SERPL QL IA: ABNORMAL
LV EJECT FRACT  99904: 52
LV EJECT FRACT MOD 2C 99903: 57.28
LV EJECT FRACT MOD 4C 99902: 48.7
LV EJECT FRACT MOD BP 99901: 53.41
OSMOLALITY SERPL: 351 MOSM/KG H2O (ref 278–298)
OSMOLALITY SERPL: 363 MOSM/KG H2O (ref 278–298)
POTASSIUM SERPL-SCNC: 3.9 MMOL/L (ref 3.6–5.5)
POTASSIUM SERPL-SCNC: 4 MMOL/L (ref 3.6–5.5)
POTASSIUM SERPL-SCNC: 4.1 MMOL/L (ref 3.6–5.5)
POTASSIUM SERPL-SCNC: 4.3 MMOL/L (ref 3.6–5.5)
SODIUM SERPL-SCNC: 155 MMOL/L (ref 135–145)
SODIUM SERPL-SCNC: 156 MMOL/L (ref 135–145)
SODIUM SERPL-SCNC: 158 MMOL/L (ref 135–145)
SODIUM SERPL-SCNC: 160 MMOL/L (ref 135–145)

## 2021-07-22 PROCEDURE — 700102 HCHG RX REV CODE 250 W/ 637 OVERRIDE(OP): Performed by: INTERNAL MEDICINE

## 2021-07-22 PROCEDURE — 99232 SBSQ HOSP IP/OBS MODERATE 35: CPT | Mod: GC | Performed by: INTERNAL MEDICINE

## 2021-07-22 PROCEDURE — 99291 CRITICAL CARE FIRST HOUR: CPT | Performed by: INTERNAL MEDICINE

## 2021-07-22 PROCEDURE — 93306 TTE W/DOPPLER COMPLETE: CPT | Mod: 26 | Performed by: INTERNAL MEDICINE

## 2021-07-22 PROCEDURE — 700111 HCHG RX REV CODE 636 W/ 250 OVERRIDE (IP): Performed by: INTERNAL MEDICINE

## 2021-07-22 PROCEDURE — 82962 GLUCOSE BLOOD TEST: CPT | Mod: 91

## 2021-07-22 PROCEDURE — 99232 SBSQ HOSP IP/OBS MODERATE 35: CPT | Mod: 25 | Performed by: INTERNAL MEDICINE

## 2021-07-22 PROCEDURE — A9270 NON-COVERED ITEM OR SERVICE: HCPCS | Performed by: INTERNAL MEDICINE

## 2021-07-22 PROCEDURE — 83930 ASSAY OF BLOOD OSMOLALITY: CPT | Mod: 91

## 2021-07-22 PROCEDURE — 80048 BASIC METABOLIC PNL TOTAL CA: CPT | Mod: 91

## 2021-07-22 PROCEDURE — 770022 HCHG ROOM/CARE - ICU (200)

## 2021-07-22 PROCEDURE — 93306 TTE W/DOPPLER COMPLETE: CPT

## 2021-07-22 PROCEDURE — 700105 HCHG RX REV CODE 258: Performed by: INTERNAL MEDICINE

## 2021-07-22 PROCEDURE — 93010 ELECTROCARDIOGRAM REPORT: CPT | Performed by: INTERNAL MEDICINE

## 2021-07-22 RX ORDER — DEXTROSE MONOHYDRATE 25 G/50ML
50 INJECTION, SOLUTION INTRAVENOUS
Status: DISCONTINUED | OUTPATIENT
Start: 2021-07-22 | End: 2021-07-24

## 2021-07-22 RX ORDER — DEXTROSE MONOHYDRATE 50 MG/ML
INJECTION, SOLUTION INTRAVENOUS CONTINUOUS
Status: DISCONTINUED | OUTPATIENT
Start: 2021-07-22 | End: 2021-07-23

## 2021-07-22 RX ORDER — DEXTROSE MONOHYDRATE 25 G/50ML
50 INJECTION, SOLUTION INTRAVENOUS
Status: DISCONTINUED | OUTPATIENT
Start: 2021-07-22 | End: 2021-07-22

## 2021-07-22 RX ADMIN — DEXTROSE MONOHYDRATE: 50 INJECTION, SOLUTION INTRAVENOUS at 15:13

## 2021-07-22 RX ADMIN — INSULIN HUMAN 6 UNITS: 100 INJECTION, SOLUTION PARENTERAL at 05:45

## 2021-07-22 RX ADMIN — INSULIN GLARGINE 30 UNITS: 100 INJECTION, SOLUTION SUBCUTANEOUS at 21:10

## 2021-07-22 RX ADMIN — HEPARIN SODIUM 5000 UNITS: 5000 INJECTION, SOLUTION INTRAVENOUS; SUBCUTANEOUS at 13:33

## 2021-07-22 RX ADMIN — DOCUSATE SODIUM 50 MG AND SENNOSIDES 8.6 MG 2 TABLET: 8.6; 5 TABLET, FILM COATED ORAL at 06:00

## 2021-07-22 RX ADMIN — BICTEGRAVIR SODIUM, EMTRICITABINE, AND TENOFOVIR ALAFENAMIDE FUMARATE 1 TABLET: 50; 200; 25 TABLET ORAL at 07:13

## 2021-07-22 RX ADMIN — INSULIN HUMAN 10 UNITS: 100 INJECTION, SUSPENSION SUBCUTANEOUS at 15:15

## 2021-07-22 RX ADMIN — HEPARIN SODIUM 5000 UNITS: 5000 INJECTION, SOLUTION INTRAVENOUS; SUBCUTANEOUS at 06:00

## 2021-07-22 RX ADMIN — METOPROLOL TARTRATE 50 MG: 50 TABLET, FILM COATED ORAL at 17:41

## 2021-07-22 RX ADMIN — HEPARIN SODIUM 5000 UNITS: 5000 INJECTION, SOLUTION INTRAVENOUS; SUBCUTANEOUS at 21:16

## 2021-07-22 RX ADMIN — INSULIN HUMAN 3 UNITS: 100 INJECTION, SOLUTION PARENTERAL at 01:26

## 2021-07-22 RX ADMIN — AMIODARONE HYDROCHLORIDE 1 MG/MIN: 1.8 INJECTION, SOLUTION INTRAVENOUS at 04:54

## 2021-07-22 RX ADMIN — ASPIRIN 81 MG: 81 TABLET, COATED ORAL at 06:00

## 2021-07-22 RX ADMIN — METOPROLOL TARTRATE 50 MG: 50 TABLET, FILM COATED ORAL at 06:00

## 2021-07-22 ASSESSMENT — ENCOUNTER SYMPTOMS
MYALGIAS: 0
NAUSEA: 0
SPUTUM PRODUCTION: 0
CHILLS: 0
COUGH: 0
PALPITATIONS: 0
VOMITING: 0
HEADACHES: 0
HEMOPTYSIS: 0
ORTHOPNEA: 0
ABDOMINAL PAIN: 0
NECK PAIN: 0
FEVER: 0

## 2021-07-22 ASSESSMENT — FIBROSIS 4 INDEX: FIB4 SCORE: 0.8

## 2021-07-22 NOTE — ASSESSMENT & PLAN NOTE
Paroxysmal during critical illness  Optimize electrolytes: Mg 2, K 4  Rate control using metoprolol  Formal ECHO  [id number] Score 2 - reassess AC following critical illness as it only occurred briefly x 2

## 2021-07-22 NOTE — PROGRESS NOTES
Pt converted into Afib at 2116  Rate 130-155  Pt asymptomatic and is resting comfortably  EKG ordered per protocol  Dr Ahumada notified

## 2021-07-22 NOTE — PROGRESS NOTES
INFECTIOUS DISEASES INPATIENT PROGRESS NOTE     Date of Service: 7/22/2021    Consult Requested By: Sidney Jarrett M.D.    Reason for Consultation: HIV recommendations.    History of Present Illness:   56-year-old male presented on 07/20/2021 with altered mental status.    Past medical history significant for HIV (possible medication noncompliance x 6 months), hypertension.    Per family, patient has had progressive confusion since roughly 07/17/2021.  They report witnessing him walk into walls and flipping bed mattresses but responding that he is fine.  They called EMS after confusion progressed to near unresponsiveness.  Per wife, patient has history over previous years of being thirsty and urinating frequently.  Family history is unclear as patient was born and raised in [place].  Per report, patient and wife lived in [place] until roughly 1-1/2 years ago.  He was seeing an infectious disease provider there who provided HAART medications per wife, patient was previously on Atripla.  He has not established a new provider since moving to [place].  Last CD4 count is unknown.  Wife reports that last viral load was undetectable when drawn while living in [place].    Patient was found to be in DKA upon arrival by ambulance to the emergency department, though he has never been previously diagnosed with diabetes.  Glucose was 1292.  Creatinine was also elevated to 4.51.  HIV and CD4 count were ordered.  Absolute lymphocyte count was 10.7.  He was started on aggressive fluids with insulin infusion for DKA treatment.     Pain all other review of systems reviewed and negative except those documented above in the HPI.     07/22 - Respirations elevated over 20 all night, other vital signs stable.  No new CBC.  Started Biktarvy yesterday.  Mental status significantly improved.    PMH:   Past Medical History:   Diagnosis Date   • HIV (human immunodeficiency virus infection) (Formerly [facility]) 2006   • Lung field abnormal finding on  examination 7/20/2021       PSH:  History reviewed. No pertinent surgical history.    FAMILY HX:  History reviewed. No pertinent family history.    SOCIAL HX:  Social History     Socioeconomic History   • Marital status:      Spouse name: Not on file   • Number of children: Not on file   • Years of education: Not on file   • Highest education level: Not on file   Occupational History   • Not on file   Tobacco Use   • Smoking status: Never Smoker   • Smokeless tobacco: Never Used   Substance and Sexual Activity   • Alcohol use: Not Currently   • Drug use: Never   • Sexual activity: Not on file   Other Topics Concern   • Not on file   Social History Narrative   • Not on file     Social Determinants of Health     Financial Resource Strain:    • Difficulty of Paying Living Expenses:    Food Insecurity:    • Worried About Running Out of Food in the Last Year:    • Ran Out of Food in the Last Year:    Transportation Needs:    • Lack of Transportation (Medical):    • Lack of Transportation (Non-Medical):    Physical Activity:    • Days of Exercise per Week:    • Minutes of Exercise per Session:    Stress:    • Feeling of Stress :    Social Connections:    • Frequency of Communication with Friends and Family:    • Frequency of Social Gatherings with Friends and Family:    • Attends [employer] Services:    • Active Member of Clubs or Organizations:    • Attends Club or Organization Meetings:    • Marital Status:    Intimate Partner Violence:    • Fear of Current or Ex-Partner:    • Emotionally Abused:    • Physically Abused:    • Sexually Abused:      Social History     Tobacco Use   Smoking Status Never Smoker   Smokeless Tobacco Never Used     Social History     Substance and Sexual Activity   Alcohol Use Not Currently       Allergies/Intolerances:  No Known Allergies    History reviewed with the patient? Yes.    Other Current Medications:    Current Facility-Administered Medications:   •  insulin glargine (Semglee)  "injection, 30 Units, Subcutaneous, Q EVENING, Sidney Jarrett M.D.  •  insulin regular (HumuLIN R,NovoLIN R) injection, 3-14 Units, Subcutaneous, 4X/DAY ACHS, 12 Units at 21 1116 **AND** POC blood glucose manual result, , , Q AC AND BEDTIME(S) **AND** NOTIFY MD and PharmD, , , Once **AND** glucose 4 g chewable tablet 16 g, 16 g, Oral, Q15 MIN PRN **AND** dextrose 50% (D50W) injection 50 mL, 50 mL, Intravenous, Q15 MIN PRN, Sidney Jarrett M.D.  •  dextrose 5% infusion, , Intravenous, Continuous, Sidney Jarrett M.D., Last Rate: 50 mL/hr at 21 1513, New Bag at 21 1513  •  bictegravir-emtricitab-TAF (Biktarvy) -25 mg tablet 1 tablet, 1 tablet, Oral, DAILY, Brendan Leon M.D., 1 tablet at 21 0713  •  metoprolol tartrate (LOPRESSOR) tablet 50 mg, 50 mg, Oral, TWICE DAILY, Carmelo Ahumada D.O., 50 mg at 21 0600  •  aspirin EC (ECOTRIN) tablet 81 mg, 81 mg, Oral, DAILY, Stiven Champion M.D., 81 mg at 21 0600  •  senna-docusate (PERICOLACE or SENOKOT S) 8.6-50 MG per tablet 2 tablet, 2 tablet, Oral, BID, 2 tablet at 21 0600 **AND** polyethylene glycol/lytes (MIRALAX) PACKET 1 Packet, 1 Packet, Oral, QDAY PRN **AND** [DISCONTINUED] magnesium hydroxide (MILK OF MAGNESIA) suspension 30 mL, 30 mL, Oral, QDAY PRN **AND** bisacodyl (DULCOLAX) suppository 10 mg, 10 mg, Rectal, QDAY PRN, Stiven Champion M.D.  •  heparin injection 5,000 Units, 5,000 Units, Subcutaneous, Q8HRS, Stiven Champion M.D., 5,000 Units at 21 1333  •  ondansetron (ZOFRAN) syringe/vial injection 4 mg, 4 mg, Intravenous, Q4HRS PRN, Stiven Champion M.D.  •  ondansetron (ZOFRAN ODT) dispertab 4 mg, 4 mg, Oral, Q4HRS PRN, Stiven Champion M.D.  [unfilled]    Most Recent Vital Signs:  /83   Pulse 92   Temp 36 °C (96.8 °F) (Temporal)   Resp (!) 28   Ht 1.753 m (5' 9\")   Wt 87.3 kg (192 lb 7.4 oz)   SpO2 96%   BMI 28.42 kg/m²   Temp  Av.7 °C (98 °F)  Min: 36.7 °C (98 " °F)  Max: 36.7 °C (98 °F)    Physical Exam:  General: well nourished, no diaphoresis, well-appearing, no acute distress  HEENT: sclera anicteric, PERRL, extraocular muscles intact, mucous membranes moist, oropharynx clear and moist, no oral lesions or exudate  Neck: supple, no lymphadenopathy  Chest: CTAB, no rales, rhonchi or wheezes, normal work of breathing.  Cardiac: regular rate and rhythm, normal S1 S2, no murmurs, rubs or gallops  Abdomen: + bowel sounds, soft, non-tender, non-distended, no hepatosplenomegaly  Extremities: WWP, no edema, 2+ pedal pulses  Skin: warm and dry, no rashes or worrisome lesions  Neuro: Alert and oriented times 3, non-focal exam, speech fluent, full range of motion to bilateral upper and lower extremities  Psych: normal mood and behavior, pleasant; memory intact, normal judgement    Pertinent Lab Results:  Recent Labs     07/20/21  1315 07/21/21  0643 07/21/21  0830   WBC 11.6* RR 12.7*      Recent Labs     07/20/21  1315 07/21/21  0643 07/21/21  0830   HEMOGLOBIN 12.6* RR 11.9*   HEMATOCRIT 41.4* RR 37.8*   MCV 92.2 RR 87.7   MCH 28.1 RR 27.6   PLATELETCT 240          Recent Labs     07/21/21  0130 07/21/21  0643 07/21/21  0830   SODIUM 166* 152* 171*   POTASSIUM 4.7 4.2 3.9   CHLORIDE 127* 121* 131*   CO2 19* 14* 27   CREATININE 2.48* 1.04 1.98*        Recent Labs     07/20/21  1315   ALBUMIN 4.4        Pertinent Micro:  Results     Procedure Component Value Units Date/Time    URINE CULTURE(NEW) [[id number]] Collected: 07/20/21 1252    Order Status: Completed Specimen: Urine Updated: 07/22/21 1514     Significant Indicator NEG     Source UR     Site -     Culture Result No growth at 24 hours.    Narrative:      Indication for culture:->Evaluation for sepsis without a  clear source of infection  Indication for culture:->Evaluation for sepsis without a    Chlamydia/GC PCR Urine Or Swab [[id number]] Collected: 07/21/21 1522    Order Status: Completed Specimen: Urine, First Catch  "Updated: 07/21/21 1522    BLOOD CULTURE [[id number]] Collected: 07/20/21 1244    Order Status: Completed Specimen: Blood from Peripheral Updated: 07/21/21 0725     Significant Indicator NEG     Source BLD     Site PERIPHERAL     Culture Result No Growth  Note: Blood cultures are incubated for 5 days and  are monitored continuously.Positive blood cultures  are called to the RN and reported as soon as  they are identified.      Narrative:      2 of 2 blood culture x2  Sites order. Per Hospital Policy:  Only change Specimen Src: to \"Line\" if specified by physician  order.  Left AC    URINALYSIS [[id number]]  (Abnormal) Collected: 07/20/21 1531    Order Status: Completed Specimen: Urine Updated: 07/20/21 1654     Color Yellow     Character Clear     Specific Gravity 1.026     Ph 5.0     Glucose >=1000 mg/dL      Ketones Trace mg/dL      Protein 30 mg/dL      Bilirubin Negative     Urobilinogen, Urine 0.2     Nitrite Negative     Leukocyte Esterase Negative     Occult Blood Moderate     Micro Urine Req Microscopic    URINALYSIS [[id number]]  (Abnormal) Collected: 07/20/21 1252    Order Status: Completed Specimen: Urine Updated: 07/20/21 1354     Color Yellow     Character Clear     Specific Gravity 1.026     Ph 5.0     Glucose >=1000 mg/dL      Ketones Trace mg/dL      Protein 30 mg/dL      Bilirubin Negative     Urobilinogen, Urine 0.2     Nitrite Negative     Leukocyte Esterase Negative     Occult Blood Small     Micro Urine Req Microscopic    Narrative:      Indication for culture:->Evaluation for sepsis without a  clear source of infection    BLOOD CULTURE [[id number]]     Order Status: Sent Specimen: Blood from Peripheral         No results found for: BLOODCULTU, BLDCULT, BCHOLD     Studies:  CT-HEAD W/O    Result Date: 7/20/2021 7/20/2021 12:07 PM HISTORY/REASON FOR EXAM:  Mental status change, unknown cause. Altered level of consciousness.  Hyperglycemia. TECHNIQUE/EXAM DESCRIPTION AND NUMBER OF VIEWS: CT of the head " without contrast. The study was performed on a helical multidetector CT scanner. Contiguous 2.5 mm axial sections were obtained from the skull base through the vertex. Up to date radiation dose reduction adjustments have been utilized to meet ALARA standards for radiation dose reduction. COMPARISON:  None available FINDINGS: Lateral ventricles are mildly enlarged, but symmetric Cortical sulci are within normal limits. No significant mass effect or midline shift. Basal cisterns are patent. No evidence for intracranial hemorrhage. Calvaria are intact. Visualized orbits are unremarkable. Visualized mastoid air cells are clear. Visualized paranasal sinuses are unremarkable.     No acute intracranial abnormality.     DX-CHEST-PORTABLE (1 VIEW)    Result Date: 7/20/2021 7/20/2021 12:25 PM HISTORY/REASON FOR EXAM:  Fever. TECHNIQUE/EXAM DESCRIPTION AND NUMBER OF VIEWS: Single portable view of the chest. COMPARISON: None FINDINGS: Lungs: Symmetric low lung volumes. No focal opacities are present. No pleural effusion or pneumothorax. Mediastinum: The cardiac silhouette size is accentuated secondary to low lung volumes. Other: None.     No acute cardiac or pulmonary abnormalities are identified.    IR-MIDLINE CATHETER INSERTION WO GUIDANCE > AGE 3    Result Date: 7/21/2021  HISTORY/REASON FOR EXAM:  Midline Placement   TECHNIQUE/EXAM DESCRIPTION AND NUMBER OF VIEWS: Midline insertion with ultrasound guidance.  FINDINGS: Midline insertion with Ultrasound Guidance was performed by qualified nursing staff without the assistance of a Radiologist. Midline positioning as measured by RN or as appropriate length of catheter selected.              Ultrasound-guided midline placement performed by qualified nursing staff as above.       Pertinent Diagnoses:   DKA, improving  Acute encephalopathy, improving  HIV, off ART for the past 6 months, previously well controlled  RACHEL, unknown baseline      PLAN:   Emperatriz Sheikh is a 56  y.o. male with history of HIV (with 6 months of medication noncompliance), who presented with altered mental status was found to have diabetic ketoacidosis without a previous diagnosis of diabetes.  Altered status is likely related to DKA but given history of HIV, will continue to monitor for other sources and signs of infection or cause. Regarding HIV, this was diagnosed about 15 years ago when patient was in the [place], during routine screening, no history of opportunistic infections.  He has been on Atripla for about 10 to 15 years.  Per wife, his viral load has been undetectable, CD4 count is unknown.  Patient is originally from the [place], has not been told if he has HIV-1 or HIV-2 infection.  Prior to [place], patient lived in [place], moved about 1-1/2 years ago, ran out of his ART about 6 months ago and per wife has been trying to get into the [facility] clinic since then, but has been unable to do so.    -CD4 count pending.  Follow results.  -Blood cultures showing no growth to date.  -No lumbar puncture at this time.  -Patient without headache or vision changes, not on oxygen, normal chest X-ray, mental status improved rapidly with management of DKA.  Will reinitiate ART with p.o. Biktarvy one tab daily.  -Patient needs provider for management of HIV medications.  -[facility] is an option for patient to establish care.  Per family, unable to establish provider for HAART medications while here in [place]. ID will contact.  -GenoSure HIV genotype sent.  Sequence for resistance mutations.  -Routine HIV testing including hepatitis A, B, C serologies, Treponema IgG, QuantiFERON gold, toxoplasma IgG, urine GC/CT NAAT.  - ID will sign off at this time. Please do not hesitate to contact if further questions.    Plan of care discussed with IM Sidney Jarrett M.D.    Rome Sultana M.D.

## 2021-07-22 NOTE — PROGRESS NOTES
Gloria  from Lab called with critical sodium result of 161 (up from 149) at 1920. Critical lab result read back to Gloria.   Dr. Ahumada notified of critical lab result at 1922.  Critical lab result read back by Dr. Ahumada.

## 2021-07-22 NOTE — PROGRESS NOTES
Interval Critical Care Progress Note:    Contacted by nursing that pt was in a fib with RVR.  Pt was treated with metoprolol one time previously for a fib RVR, but no further metoprolol was ordered.    Pt given metoprolol 5 mg IV and metoprolol 50 mg PO bid, as well as amiodarone 150 mg IV followed by amiodarone gtt.    Pt's rated decreasing to 100 - 116 bpm.

## 2021-07-22 NOTE — PROGRESS NOTES
1950 Dr Ahumada notified that pt's most recent BMP resulted and pt now meets criteria to be transitioned off DKA protocol    CO2: 26  A  Last 4 blood sugars: 135, 103, 126, 113    Dr Ahumada declined and deferred to day shift    Spoke with pharmacist to verify pt eligibility to be transitioned off DKA protocol    Charge RN notified

## 2021-07-22 NOTE — PROGRESS NOTES
"Critical Care Progress Note    Date of admission  7/20/2021    Chief Complaint  \"56 y.o. male with h/o currently untreated HIV, HTN who presented 7/20/2021 with AMS.  History obtained from wife and stepdaughter. Patient has had progressive confusion and changes in mental status for 3 days.  He was walking into walls, but kept saying he was OK.  Today he became nearly unresponsive so was BIBA.  In the ER he was found to have significant metabolic disturbances including DKA with severe hyperglycemia and renal failure.  Other than the mental status change, he had been in his usual state of health without complaints.      Pt and wife moved from [place] about 1.5 years ago.  Patient spent 6 months in [place] visiting family before joining his wife (Di) in [place].  He had an ID provider in [place], but hasn't established care with anyone in [place] so doesn't have HAART meds.  Wife doesn't know what his last CD4 count was. Otherwise he has HTN but is otherwise healthy.  He works as a CNA.  His wife and step-daughter are CNA and RN respectively, both working at [employer].\"    Hospital Course  7/20 admitted to ICU  7/21 remains on insulin gtt  7/22 paroxysmal a.fib, converting from insulin gtt, D5W for refractory hyperNa    Interval Problem Update  Reviewed last 24 hour events:  Awake and alert  Na 168  VTE ppx  Afebrile  SR-ST  MAP > 65 without pressors    Review of Systems  Review of Systems   Constitutional: Positive for malaise/fatigue. Negative for chills and fever.   HENT: Negative for hearing loss and tinnitus.    Respiratory: Negative for cough, hemoptysis and sputum production.    Cardiovascular: Negative for chest pain, palpitations, orthopnea and leg swelling.   Gastrointestinal: Negative for abdominal pain, nausea and vomiting.   Genitourinary: Positive for frequency. Negative for dysuria and urgency.   Musculoskeletal: Negative for myalgias and neck pain.   Neurological: Negative for headaches.        Vital " Signs for last 24 hours   Temp:  [35.9 °C (96.6 °F)-37.6 °C (99.7 °F)] 36 °C (96.8 °F)  Pulse:  [] 92  Resp:  [14-29] 28  BP: ()/(60-87) 116/83  SpO2:  [91 %-96 %] 96 %    Hemodynamic parameters for last 24 hours       Respiratory Information for the last 24 hours       Physical Exam   Physical Exam  Vitals and nursing note reviewed.   Constitutional:       General: He is not in acute distress.     Appearance: Normal appearance. He is ill-appearing.   HENT:      Head: Normocephalic and atraumatic.      Right Ear: External ear normal.      Left Ear: External ear normal.      Nose: Nose normal.      Mouth/Throat:      Mouth: Mucous membranes are moist.   Eyes:      Pupils: Pupils are equal, round, and reactive to light.   Cardiovascular:      Rate and Rhythm: Normal rate and regular rhythm.      Pulses: Normal pulses.      Heart sounds: No murmur heard.   No gallop.    Pulmonary:      Effort: Pulmonary effort is normal. No respiratory distress.      Breath sounds: No wheezing or rales.   Chest:      Chest wall: No tenderness.   Abdominal:      General: Abdomen is flat. There is no distension.      Palpations: Abdomen is soft.      Tenderness: There is no abdominal tenderness. There is no guarding or rebound.   Musculoskeletal:         General: No swelling, tenderness, deformity or signs of injury.      Cervical back: No rigidity.      Right lower leg: No edema.      Left lower leg: No edema.   Lymphadenopathy:      Cervical: No cervical adenopathy.   Skin:     General: Skin is warm and dry.      Capillary Refill: Capillary refill takes less than 2 seconds.   Neurological:      General: No focal deficit present.      Mental Status: He is alert and oriented to person, place, and time.      Motor: No weakness.   Psychiatric:         Mood and Affect: Mood normal.         Medications  Current Facility-Administered Medications   Medication Dose Route Frequency Provider Last Rate Last Admin   • insulin glargine  (Semglee) injection  30 Units Subcutaneous Q EVENING Sidney Jarrett M.D.       • insulin regular (HumuLIN R,NovoLIN R) injection  3-14 Units Subcutaneous 4X/DAY ACHS Sidney Jarrett M.D.   12 Units at 07/22/21 1116    And   • glucose 4 g chewable tablet 16 g  16 g Oral Q15 MIN PRN Sidney Jarrett M.D.        And   • dextrose 50% (D50W) injection 50 mL  50 mL Intravenous Q15 MIN PRN Sidney Jarrett M.D.       • dextrose 5% infusion   Intravenous Continuous Sidney Jarrett M.D. 50 mL/hr at 07/22/21 1513 New Bag at 07/22/21 1513   • bictegravir-emtricitab-TAF (Biktarvy) -25 mg tablet 1 tablet  1 tablet Oral DAILY Brendan Leon M.D.   1 tablet at 07/22/21 0713   • metoprolol tartrate (LOPRESSOR) tablet 50 mg  50 mg Oral TWICE DAILY Carmelo Ahumada D.O.   50 mg at 07/22/21 0600   • aspirin EC (ECOTRIN) tablet 81 mg  81 mg Oral DAILY Stiven Champion M.D.   81 mg at 07/22/21 0600   • senna-docusate (PERICOLACE or SENOKOT S) 8.6-50 MG per tablet 2 tablet  2 tablet Oral BID Stiven Champion M.D.   2 tablet at 07/22/21 0600    And   • polyethylene glycol/lytes (MIRALAX) PACKET 1 Packet  1 Packet Oral QDAY PRN Stiven Champion M.D.        And   • bisacodyl (DULCOLAX) suppository 10 mg  10 mg Rectal QDAY PRN Stiven Champion M.D.       • heparin injection 5,000 Units  5,000 Units Subcutaneous Q8HRS Stiven Champion M.D.   5,000 Units at 07/22/21 1333   • ondansetron (ZOFRAN) syringe/vial injection 4 mg  4 mg Intravenous Q4HRS PRN Stiven Champion M.D.       • ondansetron (ZOFRAN ODT) dispertab 4 mg  4 mg Oral Q4HRS PRN Stiven Champion M.D.           Fluids    Intake/Output Summary (Last 24 hours) at 7/22/2021 1604  Last data filed at 7/22/2021 0600  Gross per 24 hour   Intake 2932.08 ml   Output 650 ml   Net 2282.08 ml       Laboratory          Recent Labs     07/20/21  1315 07/20/21  1551 07/20/21  1730 07/20/21  2125 07/21/21  0130 07/21/21  0643 07/21/21  1542 07/21/21  1806 07/22/21  0030  07/22/21  0530 07/22/21  1230   SODIUM   < >  --  154*   < > 166*   < > 149*   < > 158* 156* 160*   POTASSIUM   < >  --  5.7*   < > 4.7   < > 3.0*   < > 4.0 4.1 4.3   CHLORIDE   < >  --  113*   < > 127*   < > 117*   < > 125* 123* 125*   CO2   < >  --  13*   < > 19*   < > 21   < > 22 25 26   BUN   < >  --  76*   < > 61*   < > 29*   < > 25* 26* 29*   CREATININE   < >  --  3.62*   < > 2.48*   < > 1.62*   < > 1.71* 1.70* 1.89*   MAGNESIUM  --  4.0*  --   --   --   --   --   --   --   --   --    PHOSPHORUS   < > 7.4* 7.3*  --  4.1  --  1.8*  --   --   --   --    CALCIUM   < >  --  7.9*   < > 8.8   < > 6.7*   < > 8.0* 8.1* 8.4*    < > = values in this interval not displayed.     Recent Labs     07/20/21  1315 07/20/21  1551 07/22/21  0030 07/22/21  0530 07/22/21  1230   ALTSGPT 32  --   --   --   --    ASTSGOT 15  --   --   --   --    ALKPHOSPHAT 117*  --   --   --   --    TBILIRUBIN 0.3  --   --   --   --    GLUCOSE 1292*   < > 272* 393* 413*    < > = values in this interval not displayed.     Recent Labs     07/20/21  1315 07/21/21  0643 07/21/21  0830   WBC 11.6* RR 12.7*   NEUTSPOLYS 81.90*  --  80.20*   LYMPHOCYTES 7.90*  --  10.70*   MONOCYTES 9.50  --  8.20   EOSINOPHILS 0.00  --  0.00   BASOPHILS 0.30  --  0.10   ASTSGOT 15  --   --    ALTSGPT 32  --   --    ALKPHOSPHAT 117*  --   --    TBILIRUBIN 0.3  --   --      Recent Labs     07/20/21 1315 07/21/21  0643 07/21/21  0830   RBC 4.49* RR 4.31*   HEMOGLOBIN 12.6* RR 11.9*   HEMATOCRIT 41.4* RR 37.8*   PLATELETCT 240    PROTHROMBTM 16.5*  --   --    INR 1.37*  --   --        Imaging  X-Ray:  I have personally reviewed the images and compared with prior images.    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    Crystalloid resuscitation   Electrolyte replacement  Transitioned to subcutaneous insulin 7/21    Atrial fibrillation (HCC)  Assessment &  Plan  Paroxysmal during critical illness  Optimize electrolytes: Mg 2, K 4  Rate control using metoprolol  Formal ECHO  [id number] Score 2 - reassess AC following critical illness as it only occurred briefly x 2    Essential hypertension  Assessment & Plan  Reintroduce oral antihypertensives when clinically appropriate     Acute metabolic encephalopathy  Assessment & Plan  - Toxic metabolic from DKA/HHS  - Improving    Hypernatremia  Assessment & Plan  Corrected sodium was 163 --> 174 --> 163 --> 168  D5W  Allow and encourage FW intake  Trend q6 h    HIV positive ([facility])  Assessment & Plan  -HIV quant and CD4 count sent  -He has been off HAART for 6 to 12 months.  He and his wife moved from [facility] and he has not yet established care with an ID doctor to provide his meds  ID consulted    Acute renal failure (ARF) ([facility])  Assessment & Plan  Suspect hypovolemia due to osmotic diuresis  Aggressive IV fluids  Avoid nephrotoxins  UA reassuring       VTE:  Heparin  Ulcer: Not Indicated  Lines: Arterial Line  ok to remove    I have performed a physical exam and reviewed and updated ROS and Plan today (7/22/2021). In review of yesterday's note (7/21/2021), there are no changes except as documented above.     Discussed patient condition and risk of morbidity and/or mortality with Hospitalist, Family, RN, RT, Pharmacy, Charge nurse / hot rounds and Patient  The patient remains critically ill.  Critical care time = 49 minutes in directly providing and coordinating critical care and extensive data review.  No time overlap and excludes procedures.

## 2021-07-23 LAB
ANION GAP SERPL CALC-SCNC: 11 MMOL/L (ref 7–16)
ANION GAP SERPL CALC-SCNC: 7 MMOL/L (ref 7–16)
ANION GAP SERPL CALC-SCNC: 9 MMOL/L (ref 7–16)
BACTERIA UR CULT: NORMAL
BUN SERPL-MCNC: 25 MG/DL (ref 8–22)
BUN SERPL-MCNC: 25 MG/DL (ref 8–22)
BUN SERPL-MCNC: 28 MG/DL (ref 8–22)
CALCIUM SERPL-MCNC: 8.1 MG/DL (ref 8.5–10.5)
CALCIUM SERPL-MCNC: 8.2 MG/DL (ref 8.5–10.5)
CALCIUM SERPL-MCNC: 8.4 MG/DL (ref 8.5–10.5)
CHLORIDE SERPL-SCNC: 118 MMOL/L (ref 96–112)
CHLORIDE SERPL-SCNC: 123 MMOL/L (ref 96–112)
CHLORIDE SERPL-SCNC: 123 MMOL/L (ref 96–112)
CO2 SERPL-SCNC: 21 MMOL/L (ref 20–33)
CO2 SERPL-SCNC: 23 MMOL/L (ref 20–33)
CO2 SERPL-SCNC: 25 MMOL/L (ref 20–33)
CREAT SERPL-MCNC: 1.52 MG/DL (ref 0.5–1.4)
CREAT SERPL-MCNC: 1.57 MG/DL (ref 0.5–1.4)
CREAT SERPL-MCNC: 1.7 MG/DL (ref 0.5–1.4)
GLUCOSE BLD-MCNC: 282 MG/DL (ref 65–99)
GLUCOSE BLD-MCNC: 341 MG/DL (ref 65–99)
GLUCOSE BLD-MCNC: 385 MG/DL (ref 65–99)
GLUCOSE SERPL-MCNC: 294 MG/DL (ref 65–99)
GLUCOSE SERPL-MCNC: 351 MG/DL (ref 65–99)
GLUCOSE SERPL-MCNC: 383 MG/DL (ref 65–99)
HAV AB SER QL IA: POSITIVE
POTASSIUM SERPL-SCNC: 3.6 MMOL/L (ref 3.6–5.5)
POTASSIUM SERPL-SCNC: 3.7 MMOL/L (ref 3.6–5.5)
POTASSIUM SERPL-SCNC: 3.8 MMOL/L (ref 3.6–5.5)
SIGNIFICANT IND 70042: NORMAL
SITE SITE: NORMAL
SODIUM SERPL-SCNC: 150 MMOL/L (ref 135–145)
SODIUM SERPL-SCNC: 155 MMOL/L (ref 135–145)
SODIUM SERPL-SCNC: 155 MMOL/L (ref 135–145)
SOURCE SOURCE: NORMAL
T GONDII IGG SER-ACNC: 34.8 IU/ML

## 2021-07-23 PROCEDURE — 700105 HCHG RX REV CODE 258: Performed by: INTERNAL MEDICINE

## 2021-07-23 PROCEDURE — 80048 BASIC METABOLIC PNL TOTAL CA: CPT

## 2021-07-23 PROCEDURE — A9270 NON-COVERED ITEM OR SERVICE: HCPCS | Performed by: INTERNAL MEDICINE

## 2021-07-23 PROCEDURE — 770020 HCHG ROOM/CARE - TELE (206)

## 2021-07-23 PROCEDURE — 700102 HCHG RX REV CODE 250 W/ 637 OVERRIDE(OP): Performed by: INTERNAL MEDICINE

## 2021-07-23 PROCEDURE — 82962 GLUCOSE BLOOD TEST: CPT

## 2021-07-23 PROCEDURE — 99233 SBSQ HOSP IP/OBS HIGH 50: CPT | Performed by: HOSPITALIST

## 2021-07-23 PROCEDURE — 700111 HCHG RX REV CODE 636 W/ 250 OVERRIDE (IP): Performed by: INTERNAL MEDICINE

## 2021-07-23 PROCEDURE — 700105 HCHG RX REV CODE 258: Performed by: HOSPITALIST

## 2021-07-23 RX ORDER — SODIUM CHLORIDE 450 MG/100ML
INJECTION, SOLUTION INTRAVENOUS CONTINUOUS
Status: DISCONTINUED | OUTPATIENT
Start: 2021-07-23 | End: 2021-07-25 | Stop reason: HOSPADM

## 2021-07-23 RX ADMIN — DOCUSATE SODIUM 50 MG AND SENNOSIDES 8.6 MG 2 TABLET: 8.6; 5 TABLET, FILM COATED ORAL at 05:47

## 2021-07-23 RX ADMIN — INSULIN GLARGINE 30 UNITS: 100 INJECTION, SOLUTION SUBCUTANEOUS at 16:50

## 2021-07-23 RX ADMIN — HEPARIN SODIUM 5000 UNITS: 5000 INJECTION, SOLUTION INTRAVENOUS; SUBCUTANEOUS at 05:46

## 2021-07-23 RX ADMIN — SODIUM CHLORIDE: 4.5 INJECTION, SOLUTION INTRAVENOUS at 17:05

## 2021-07-23 RX ADMIN — BICTEGRAVIR SODIUM, EMTRICITABINE, AND TENOFOVIR ALAFENAMIDE FUMARATE 1 TABLET: 50; 200; 25 TABLET ORAL at 05:47

## 2021-07-23 RX ADMIN — ASPIRIN 81 MG: 81 TABLET, COATED ORAL at 05:47

## 2021-07-23 RX ADMIN — HEPARIN SODIUM 5000 UNITS: 5000 INJECTION, SOLUTION INTRAVENOUS; SUBCUTANEOUS at 21:14

## 2021-07-23 RX ADMIN — METOPROLOL TARTRATE 50 MG: 50 TABLET, FILM COATED ORAL at 17:04

## 2021-07-23 RX ADMIN — DEXTROSE MONOHYDRATE: 50 INJECTION, SOLUTION INTRAVENOUS at 01:51

## 2021-07-23 RX ADMIN — HEPARIN SODIUM 5000 UNITS: 5000 INJECTION, SOLUTION INTRAVENOUS; SUBCUTANEOUS at 13:21

## 2021-07-23 RX ADMIN — METOPROLOL TARTRATE 50 MG: 50 TABLET, FILM COATED ORAL at 05:47

## 2021-07-23 ASSESSMENT — ENCOUNTER SYMPTOMS
CARDIOVASCULAR NEGATIVE: 1
GASTROINTESTINAL NEGATIVE: 1
PSYCHIATRIC NEGATIVE: 1
NEUROLOGICAL NEGATIVE: 1
MUSCULOSKELETAL NEGATIVE: 1
EYES NEGATIVE: 1
RESPIRATORY NEGATIVE: 1

## 2021-07-23 ASSESSMENT — COGNITIVE AND FUNCTIONAL STATUS - GENERAL
SUGGESTED CMS G CODE MODIFIER MOBILITY: CI
MOBILITY SCORE: 23
SUGGESTED CMS G CODE MODIFIER DAILY ACTIVITY: CH
DAILY ACTIVITIY SCORE: 24
CLIMB 3 TO 5 STEPS WITH RAILING: A LITTLE

## 2021-07-23 ASSESSMENT — LIFESTYLE VARIABLES
EVER HAD A DRINK FIRST THING IN THE MORNING TO STEADY YOUR NERVES TO GET RID OF A HANGOVER: NO
TOTAL SCORE: 0
HAVE PEOPLE ANNOYED YOU BY CRITICIZING YOUR DRINKING: NO
ALCOHOL_USE: NO
HAVE YOU EVER FELT YOU SHOULD CUT DOWN ON YOUR DRINKING: NO
AVERAGE NUMBER OF DAYS PER WEEK YOU HAVE A DRINK CONTAINING ALCOHOL: 0
TOTAL SCORE: 0
CONSUMPTION TOTAL: NEGATIVE
DOES PATIENT WANT TO STOP DRINKING: NO
EVER FELT BAD OR GUILTY ABOUT YOUR DRINKING: NO
TOTAL SCORE: 0
HOW MANY TIMES IN THE PAST YEAR HAVE YOU HAD 5 OR MORE DRINKS IN A DAY: 0
ON A TYPICAL DAY WHEN YOU DRINK ALCOHOL HOW MANY DRINKS DO YOU HAVE: 0

## 2021-07-23 ASSESSMENT — FIBROSIS 4 INDEX
FIB4 SCORE: 0.8
FIB4 SCORE: 0.8

## 2021-07-23 ASSESSMENT — PATIENT HEALTH QUESTIONNAIRE - PHQ9
SUM OF ALL RESPONSES TO PHQ9 QUESTIONS 1 AND 2: 0
2. FEELING DOWN, DEPRESSED, IRRITABLE, OR HOPELESS: NOT AT ALL
1. LITTLE INTEREST OR PLEASURE IN DOING THINGS: NOT AT ALL

## 2021-07-23 ASSESSMENT — PAIN DESCRIPTION - PAIN TYPE: TYPE: ACUTE PAIN

## 2021-07-23 NOTE — PLAN OF CARE (IOPOC)
56-year-old male admitted with new onset diabetes, HHS/DKA course complicated by 2 episodes of atrial fibrillation with RVR and persistent hypernatremia NOS.  For his hypernatremia he is on a D5W infusion which is slowly correcting this issue.  He is stable for telemetry floor under the care of the hospitalist.    Sidney Jarrett M.D.

## 2021-07-23 NOTE — ASSESSMENT & PLAN NOTE
Short-lived, converted to sinus rhythm  [id number] Score 2 -  Continue with prophylactic heparin dose  Echocardiogram overall benign  Hold off on anticoagulation for the time being

## 2021-07-23 NOTE — ASSESSMENT & PLAN NOTE
Secondary to free water depletion  Follow closely,  Encourage free water intake  Continue IV fluids for now

## 2021-07-23 NOTE — CONSULTS
Diabetes education: Pt is newly diagnosed with diabetes, admitted with blood sugar of >1500 and Hg a1c of 11.3%.  Pt is also HIV + but off meds x 6 months as he was not able to follow up with HIV team. Per wife, she has been calling [facility] clinic every am to get an appointment. CDE called [facility] this afternoon and after listening to prompts ( appointment, #1, HIV set up 0) was able to connect with CM for HIV clinic. CDE assisted wife in calling directly as she needed to give authorization to make an appointment. Appointment will be Aug  2.  Met with pt and wife to begin diabetes education. Discussed what diabetes was, what effects blood sugars, goals for blood sugars, hyperglycemia, need for protein and carbs with every meal and why.  Plan: Pt was asked to give his insulin and do a finger stick with nursing. CDE will follow up tomorrow to teach insulin pens, give and teach a meter. Pt was given a [facility] DM book and reviewed.  Please order One touch verio flex meter, test strips and lancets ( maybe Accucheck guide) frequency to be determined on what insulin he will use at home.   Please order lantus solostar pens, Humalog kwik pens and mellisa pen needles. Please use dm order set F2 for correct meter and send to [facility] [place] ( to assist if insulin or meter not correct one for coverage).  Please have pt give insulin and do finger sticks with nursing.

## 2021-07-23 NOTE — CARE PLAN
Problem: Knowledge Deficit - Standard  Goal: Patient and family/care givers will demonstrate understanding of plan of care, disease process/condition, diagnostic tests and medications  7/23/2021 0356 by Aliya De La Torre RStephenN.  Outcome: Progressing  7/22/2021 2027 by Aliya De La Torre RGI.  Outcome: Progressing     Problem: Pain - Standard  Goal: Alleviation of pain or a reduction in pain to the patient’s comfort goal  7/23/2021 0356 by Aliya De La Torre R.N.  Outcome: Progressing  7/22/2021 2027 by Aliya De La Torre R.N.  Outcome: Progressing     Problem: Fall Risk  Goal: Patient will remain free from falls  7/23/2021 0356 by Aliya De La Torre RStephenN.  Outcome: Progressing  7/22/2021 2027 by Aliya De La Torre RStephenN.  Outcome: Progressing     Problem: Skin Integrity  Goal: Skin integrity is maintained or improved  Outcome: Progressing   The patient  blood sugars improving and Na stabilized     Shift Goals  Clinical Goals: control blood sugar,keep safety, maintain hemodnamically stable  Patient Goals: na  Family Goals: sugar control, rest and comfort    Progress made toward(s) clinical / shift goals:  yes    Patient is not progressing towards the following goals:

## 2021-07-23 NOTE — CARE PLAN
The patient is Stable - Low risk of patient condition declining or worsening    Shift Goals  Clinical Goals: control blood sugar, maintain safety, maintain hemodnamically stable and monitor Q6H BMPs.  Patient Goals: Blood sugar control  Family Goals: sugar control, rest and comfort    Progress made toward(s) clinical / shift goals: Progressing      Problem: Knowledge Deficit - Standard  Goal: Patient and family/care givers will demonstrate understanding of plan of care, disease process/condition, diagnostic tests and medications  Outcome: Progressing

## 2021-07-23 NOTE — PROGRESS NOTES
Hospital Medicine Daily Progress Note    Date of Service  7/23/2021    Chief Complaint  Emperatriz Sheikh is a 56 y.o. male admitted 7/20/2021 with altered level of consciousness, severe hyperglycemia    Hospital Course  This is a 56-year-old  male with a history of HIV, off HAART medication for approximately 6 months, not followed by physicians presents with altered level of consciousness, encephalopathy, and severe hyperglycemia, reportedly the patient was worsening for approximately 3 days at home.  The patient found in severe DKA, acute renal failure.  Reportedly the patient was previously treated for hypertension.  The patient mated to ICU, insulin drip, was found to have severely elevated sodium levels requiring aggressive medication titration and management.  The patient was restarted on HAART treatment in conjunction of infectious disease.  The patient had episode of atrial fibrillation with RVR and was treated with beta-blockade and amiodarone intravenously, drip, converting to sinus rhythm, and echocardiogram showed overall normal structures, EF of 52%.    Interval Problem Update  Patient seen and examined today.    Patient tolerating treatment and therapies.  All Data, Medication data reviewed.  Case discussed with nursing as available.  Plan of Care reviewed with patient and notified of changes.  7/23 the patient appears improved overall, he is without complaints, he is tolerating a diet, he is drinking an adequate amount of free water, fluids.  He has been maintained on D5 water infusion at low rate to further correct his sodium, it is 150 today, corrected for glucose 155, the patient denies further confusion, headache or other current symptoms.  His renal function has been gradually improving.    I have personally seen and examined the patient at bedside. I discussed the plan of care with patient.    Consultants/Specialty  critical care    Code Status  Full Code    Disposition  Patient is not  medically cleared.   Anticipate discharge to to home with close outpatient follow-up.  I have placed the appropriate orders for post-discharge needs.    Review of Systems  Review of Systems   Constitutional: Positive for malaise/fatigue.   HENT: Negative.    Eyes: Negative.    Respiratory: Negative.    Cardiovascular: Negative.    Gastrointestinal: Negative.    Genitourinary: Negative.    Musculoskeletal: Negative.    Skin: Negative.    Neurological: Negative.    Endo/Heme/Allergies: Negative.    Psychiatric/Behavioral: Negative.    All other systems reviewed and are negative.       Physical Exam  Temp:  [36.1 °C (97 °F)-36.4 °C (97.6 °F)] 36.1 °C (97 °F)  Pulse:  [73-87] 81  Resp:  [20-26] 20  BP: ()/(57-92) 134/92  SpO2:  [94 %-98 %] 98 %    Physical Exam  Vitals and nursing note reviewed.   Constitutional:       Appearance: He is well-developed.      Comments: Pt seen and examined.   HENT:      Head: Normocephalic and atraumatic.   Eyes:      Pupils: Pupils are equal, round, and reactive to light.   Cardiovascular:      Rate and Rhythm: Normal rate.      Heart sounds: Normal heart sounds.   Pulmonary:      Effort: Pulmonary effort is normal.      Breath sounds: Normal breath sounds.   Abdominal:      General: Bowel sounds are normal.      Palpations: Abdomen is soft.      Comments: Protuberant, nontender   Genitourinary:     Penis: Normal.    Musculoskeletal:         General: Swelling present. Normal range of motion.      Cervical back: Normal range of motion and neck supple.   Skin:     General: Skin is warm and dry.   Neurological:      Mental Status: He is alert and oriented to person, place, and time.   Psychiatric:         Behavior: Behavior normal.         Fluids    Intake/Output Summary (Last 24 hours) at 7/23/2021 1518  Last data filed at 7/23/2021 0800  Gross per 24 hour   Intake 1239.17 ml   Output 1800 ml   Net -560.83 ml       Laboratory  Recent Labs     07/21/21  0643 07/21/21  0830   WBC RR  12.7*   RBC RR 4.31*   HEMOGLOBIN RR 11.9*   HEMATOCRIT RR 37.8*   MCV RR 87.7   MCH RR 27.6   MCHC RR 31.5*   RDW RR 46.4   PLATELETCT    MPV RR 10.8     Recent Labs     07/23/21  0040 07/23/21  0555 07/23/21  1215   SODIUM 155* 155* 150*   POTASSIUM 3.7 3.6 3.8   CHLORIDE 123* 123* 118*   CO2 23 25 21   GLUCOSE 351* 294* 383*   BUN 28* 25* 25*   CREATININE 1.70* 1.52* 1.57*   CALCIUM 8.2* 8.4* 8.1*                   Imaging  EC-ECHOCARDIOGRAM COMPLETE W/O CONT   Final Result      IR-MIDLINE CATHETER INSERTION WO GUIDANCE > AGE 3   Final Result                  Ultrasound-guided midline placement performed by qualified nursing staff    as above.          DX-CHEST-PORTABLE (1 VIEW)   Final Result      No acute cardiac or pulmonary abnormalities are identified.      CT-HEAD W/O   Final Result      No acute intracranial abnormality.              Assessment/Plan  * Diabetic ketoacidosis with coma (HCC)  Assessment & Plan  S/p ICU treatment, insulin drip, currently improved,  Continue short and long-acting insulin and adjust as indicated,  High hemoglobin A1c    Atrial fibrillation (HCC)  Assessment & Plan  Short-lived, converted to sinus rhythm  [id number] Score 2 -  Continue with prophylactic heparin dose,  Echocardiogram overall benign  Hold off on anticoagulation for the time being    Essential hypertension  Assessment & Plan  History of, continue metoprolol, adjust medication as indicated  Consideration for ACE or ARB    Acute metabolic encephalopathy  Assessment & Plan  Secondary to DKA, dehydration, metabolic derangement  Improving, appears to be at baseline now    Hypernatremia  Assessment & Plan  Secondary to free water depletion  Follow closely,  Encourage free water intake  Continue IV fluids for now    HIV positive (HCC)  Assessment & Plan  Started on antiviral treatment with the help of infectious disease,  Close outpatient follow-up in the clinic    Acute renal failure (ARF) (HCC)  Assessment &  Plan  Secondary to dehydration, DKA  Improving slowly, monitor urine output     Plan  Change IV fluids, follow sodium levels  Continue insulin treatment regimen  Follow blood pressure, start ACE or ARB once his renal function is further improved  See orders  Medically complex high risk patient      VTE prophylaxis: heparin ppx    I have performed a physical exam and reviewed and updated ROS and Plan today (7/23/2021). In review of yesterday's note (7/22/2021), there are no changes except as documented above.       Please note that this dictation was created using voice recognition software. I have made every reasonable attempt to correct obvious errors, but I expect that there are errors of grammar and possibly context that I did not discover before finalizing the note.

## 2021-07-23 NOTE — PROGRESS NOTES
Diabetes education: Met with pt and wife this afternoon. Please see consult note. Pt has an appointment with [place] clinic on Aug 8.  Plan: Pt was asked to give his insulin and do a finger stick with nursing. CDE will follow up tomorrow to teach insulin pens, give and teach a meter. Pt was given a [facility] DM book and reviewed.  Please order One touch verio flex meter, test strips and lancets ( maybe Accucheck guide) frequency to be determined on what insulin he will use at home.   Please order lantus solostar pens, Humalog kwik pens and mellisa pen needles. Please use dm order set F2 for correct meter and [facility] [place] ( to assist if insulin or meter not correct one for coverage).  Please have pt give insulin and do finger sticks with nursing.

## 2021-07-23 NOTE — CARE PLAN
The patient is calm and cooperative, Na is decreasing  and blood glucose is still being watched closely.  Problem: Knowledge Deficit - Standard  Goal: Patient and family/care givers will demonstrate understanding of plan of care, disease process/condition, diagnostic tests and medications  Outcome: Progressing     Problem: Pain - Standard  Goal: Alleviation of pain or a reduction in pain to the patient’s comfort goal  Outcome: Progressing     Problem: Fall Risk  Goal: Patient will remain free from falls  Outcome: Progressing     Shift Goals  Clinical Goals: control blood sugar,keep safety, maintain hemodnamically stable  Patient Goals: na  Family Goals: sugar control, rest and comfort    Progress made toward(s) clinical / shift goals:  yes    Patient is not progressing towards the following goals:

## 2021-07-24 PROBLEM — I48.0 PAROXYSMAL ATRIAL FIBRILLATION (HCC): Status: ACTIVE | Noted: 2021-07-22

## 2021-07-24 LAB
ERYTHROCYTE [DISTWIDTH] IN BLOOD BY AUTOMATED COUNT: 44.4 FL (ref 35.9–50)
GLUCOSE BLD-MCNC: 216 MG/DL (ref 65–99)
GLUCOSE BLD-MCNC: 268 MG/DL (ref 65–99)
GLUCOSE BLD-MCNC: 395 MG/DL (ref 65–99)
GLUCOSE BLD-MCNC: 396 MG/DL (ref 65–99)
GLUCOSE BLD-MCNC: 475 MG/DL (ref 65–99)
HCT VFR BLD AUTO: 34.8 % (ref 42–52)
HGB BLD-MCNC: 11 G/DL (ref 14–18)
MAGNESIUM SERPL-MCNC: 2.3 MG/DL (ref 1.5–2.5)
MCH RBC QN AUTO: 27.7 PG (ref 27–33)
MCHC RBC AUTO-ENTMCNC: 31.6 G/DL (ref 33.7–35.3)
MCV RBC AUTO: 87.7 FL (ref 81.4–97.8)
PHOSPHATE SERPL-MCNC: 3.2 MG/DL (ref 2.5–4.5)
PLATELET # BLD AUTO: 113 K/UL (ref 164–446)
PMV BLD AUTO: 11.3 FL (ref 9–12.9)
RBC # BLD AUTO: 3.97 M/UL (ref 4.7–6.1)
WBC # BLD AUTO: 5.2 K/UL (ref 4.8–10.8)

## 2021-07-24 PROCEDURE — 770020 HCHG ROOM/CARE - TELE (206)

## 2021-07-24 PROCEDURE — 700102 HCHG RX REV CODE 250 W/ 637 OVERRIDE(OP): Performed by: INTERNAL MEDICINE

## 2021-07-24 PROCEDURE — 83735 ASSAY OF MAGNESIUM: CPT

## 2021-07-24 PROCEDURE — 99233 SBSQ HOSP IP/OBS HIGH 50: CPT | Performed by: INTERNAL MEDICINE

## 2021-07-24 PROCEDURE — 700105 HCHG RX REV CODE 258: Performed by: HOSPITALIST

## 2021-07-24 PROCEDURE — A9270 NON-COVERED ITEM OR SERVICE: HCPCS | Performed by: INTERNAL MEDICINE

## 2021-07-24 PROCEDURE — 82962 GLUCOSE BLOOD TEST: CPT | Mod: 91

## 2021-07-24 PROCEDURE — 700102 HCHG RX REV CODE 250 W/ 637 OVERRIDE(OP): Performed by: STUDENT IN AN ORGANIZED HEALTH CARE EDUCATION/TRAINING PROGRAM

## 2021-07-24 PROCEDURE — 85027 COMPLETE CBC AUTOMATED: CPT

## 2021-07-24 PROCEDURE — 700111 HCHG RX REV CODE 636 W/ 250 OVERRIDE (IP): Performed by: INTERNAL MEDICINE

## 2021-07-24 PROCEDURE — A9270 NON-COVERED ITEM OR SERVICE: HCPCS | Performed by: STUDENT IN AN ORGANIZED HEALTH CARE EDUCATION/TRAINING PROGRAM

## 2021-07-24 PROCEDURE — 84100 ASSAY OF PHOSPHORUS: CPT

## 2021-07-24 RX ORDER — DEXTROSE MONOHYDRATE 25 G/50ML
50 INJECTION, SOLUTION INTRAVENOUS
Status: DISCONTINUED | OUTPATIENT
Start: 2021-07-24 | End: 2021-07-25 | Stop reason: HOSPADM

## 2021-07-24 RX ORDER — INSULIN LISPRO 100 [IU]/ML
0.2 INJECTION, SOLUTION INTRAVENOUS; SUBCUTANEOUS
Status: DISCONTINUED | OUTPATIENT
Start: 2021-07-25 | End: 2021-07-25 | Stop reason: HOSPADM

## 2021-07-24 RX ORDER — ACETAMINOPHEN 325 MG/1
650 TABLET ORAL EVERY 6 HOURS PRN
Status: DISPENSED | OUTPATIENT
Start: 2021-07-24 | End: 2021-07-25

## 2021-07-24 RX ORDER — INSULIN LISPRO 100 [IU]/ML
3-14 INJECTION, SOLUTION INTRAVENOUS; SUBCUTANEOUS
Status: DISCONTINUED | OUTPATIENT
Start: 2021-07-24 | End: 2021-07-25 | Stop reason: HOSPADM

## 2021-07-24 RX ADMIN — HEPARIN SODIUM 5000 UNITS: 5000 INJECTION, SOLUTION INTRAVENOUS; SUBCUTANEOUS at 04:31

## 2021-07-24 RX ADMIN — HEPARIN SODIUM 5000 UNITS: 5000 INJECTION, SOLUTION INTRAVENOUS; SUBCUTANEOUS at 21:28

## 2021-07-24 RX ADMIN — INSULIN GLARGINE 30 UNITS: 100 INJECTION, SOLUTION SUBCUTANEOUS at 16:17

## 2021-07-24 RX ADMIN — ASPIRIN 81 MG: 81 TABLET, COATED ORAL at 04:29

## 2021-07-24 RX ADMIN — DOCUSATE SODIUM 50 MG AND SENNOSIDES 8.6 MG 2 TABLET: 8.6; 5 TABLET, FILM COATED ORAL at 04:30

## 2021-07-24 RX ADMIN — HEPARIN SODIUM 5000 UNITS: 5000 INJECTION, SOLUTION INTRAVENOUS; SUBCUTANEOUS at 16:24

## 2021-07-24 RX ADMIN — METOPROLOL TARTRATE 50 MG: 50 TABLET, FILM COATED ORAL at 04:29

## 2021-07-24 RX ADMIN — ACETAMINOPHEN 650 MG: 325 TABLET, FILM COATED ORAL at 04:29

## 2021-07-24 RX ADMIN — BICTEGRAVIR SODIUM, EMTRICITABINE, AND TENOFOVIR ALAFENAMIDE FUMARATE 1 TABLET: 50; 200; 25 TABLET ORAL at 04:30

## 2021-07-24 RX ADMIN — METOPROLOL TARTRATE 50 MG: 50 TABLET, FILM COATED ORAL at 16:24

## 2021-07-24 RX ADMIN — SODIUM CHLORIDE: 4.5 INJECTION, SOLUTION INTRAVENOUS at 05:18

## 2021-07-24 RX ADMIN — INSULIN LISPRO 7 UNITS: 100 INJECTION, SOLUTION INTRAVENOUS; SUBCUTANEOUS at 21:30

## 2021-07-24 ASSESSMENT — PAIN DESCRIPTION - PAIN TYPE
TYPE: ACUTE PAIN
TYPE: ACUTE PAIN

## 2021-07-24 ASSESSMENT — ENCOUNTER SYMPTOMS
CHILLS: 0
VOMITING: 0
STRIDOR: 0
EYE PAIN: 0
BLURRED VISION: 0
DIZZINESS: 0
NAUSEA: 0
NERVOUS/ANXIOUS: 0
SEIZURES: 0
PALPITATIONS: 0
HEADACHES: 0
FOCAL WEAKNESS: 0
DIARRHEA: 0
MUSCULOSKELETAL NEGATIVE: 1
EYE DISCHARGE: 0
WEIGHT LOSS: 0
FEVER: 0
EYE REDNESS: 0
GASTROINTESTINAL NEGATIVE: 1
MYALGIAS: 0
COUGH: 0
HEARTBURN: 0
SPUTUM PRODUCTION: 0
CARDIOVASCULAR NEGATIVE: 1
NECK PAIN: 0
SHORTNESS OF BREATH: 0
RESPIRATORY NEGATIVE: 1
ORTHOPNEA: 0
NEUROLOGICAL NEGATIVE: 1
DEPRESSION: 0
INSOMNIA: 0
ABDOMINAL PAIN: 0
EYES NEGATIVE: 1
PSYCHIATRIC NEGATIVE: 1
BACK PAIN: 0

## 2021-07-24 ASSESSMENT — FIBROSIS 4 INDEX: FIB4 SCORE: 1.29

## 2021-07-24 NOTE — PROGRESS NOTES
Assumed care of pt. Bedside report received from JENNIFER Walter. Pt was updated on plan of care. Call light, phone and personal belongings within reach. Bed locked in lowest position, 2 side rails up. Pt is A&Ox4.

## 2021-07-24 NOTE — CARE PLAN
The patient is Stable - Low risk of patient condition declining or worsening    Shift Goals  Clinical Goals: Blood sugar control  Patient Goals: rest  Family Goals: sugar control, rest and comfort    Progress made toward(s) clinical / shift goals:  Progressing      Problem: Knowledge Deficit - Standard  Goal: Patient and family/care givers will demonstrate understanding of plan of care, disease process/condition, diagnostic tests and medications  Outcome: Progressing

## 2021-07-24 NOTE — CARE PLAN
The patient is Stable - Low risk of patient condition declining or worsening    Shift Goals  Clinical Goals: Monitor BG; DM Education  Patient Goals: Rest  Family Goals: Talk to Doctor    Progress made toward(s) clinical / shift goals:    Problem: Knowledge Deficit - Standard  Goal: Patient and family/care givers will demonstrate understanding of plan of care, disease process/condition, diagnostic tests and medications  Outcome: Progressing       Patient is not progressing towards the following goals:

## 2021-07-24 NOTE — PROGRESS NOTES
Diabetes education: Met with pt and wife to instruct on meter ( one touch verio reflect meter given) and insulin pens. Pt practiced with saline pens.  Plan:   Please order One touch verio flex meter, test strips and lancets ( maybe Accucheck guide) frequency to be determined on what insulin he will use at home.   Please order lantus solostar pens, Humalog kwik pens and mellisa pen needles. Please use dm order set F2 for correct meter and send to [facility] [place] ( to assist if insulin or meter not correct one for coverage).  Please have pt give insulin and do finger sticks with nursing.         Separate meter to be ordered in case one touch not covered as pt would need an accucheck guide meter, test strips and soft clix lancets.

## 2021-07-24 NOTE — PROGRESS NOTES
Hospital Medicine Daily Progress Note    Date of Service  7/24/2021    Chief Complaint  Emperatriz Sheikh is a 56 y.o. male admitted 7/20/2021 with altered level of consciousness, severe hyperglycemia    Hospital Course  This is a 56-year-old  male with a history of HIV, off HAART medication for approximately 6 months, not followed by physicians presents with altered level of consciousness, encephalopathy, and severe hyperglycemia, reportedly the patient was worsening for approximately 3 days at home.  The patient found in severe DKA, acute renal failure.  Reportedly the patient was previously treated for hypertension.  The patient mated to ICU, insulin drip, was found to have severely elevated sodium levels requiring aggressive medication titration and management.  The patient was restarted on HAART treatment in conjunction of infectious disease.  The patient had episode of atrial fibrillation with RVR and was treated with beta-blockade and amiodarone intravenously, drip, converting to sinus rhythm, and echocardiogram showed overall normal structures, EF of 52%.    Interval Problem Update  Patient is doing well.  No acute issue overnight.  I explained to him about his labs and he is still on 0.45%  saline infusion for his hypernatremia        I have personally seen and examined the patient at bedside. I discussed the plan of care with patient.    Consultants/Specialty  critical care    Code Status  Full Code    Disposition  Patient is not medically cleared.   Anticipate discharge to to home with close outpatient follow-up.  I have placed the appropriate orders for post-discharge needs.    Review of Systems  Review of Systems   Constitutional: Positive for malaise/fatigue. Negative for chills, fever and weight loss.   HENT: Negative.  Negative for congestion and nosebleeds.    Eyes: Negative.  Negative for blurred vision, pain, discharge and redness.   Respiratory: Negative.  Negative for cough, sputum  production, shortness of breath and stridor.    Cardiovascular: Negative.  Negative for chest pain, palpitations and orthopnea.   Gastrointestinal: Negative.  Negative for abdominal pain, diarrhea, heartburn, nausea and vomiting.   Genitourinary: Negative.  Negative for dysuria, frequency and urgency.   Musculoskeletal: Negative.  Negative for back pain, myalgias and neck pain.   Skin: Negative.  Negative for itching and rash.   Neurological: Negative.  Negative for dizziness, focal weakness, seizures and headaches.   Endo/Heme/Allergies: Negative.    Psychiatric/Behavioral: Negative.  Negative for depression. The patient is not nervous/anxious and does not have insomnia.    All other systems reviewed and are negative.       Physical Exam  Temp:  [36.1 °C (97 °F)-37.2 °C (99 °F)] 36.4 °C (97.6 °F)  Pulse:  [73-87] 79  Resp:  [16-24] 16  BP: (106-143)/(78-92) 122/82  SpO2:  [95 %-98 %] 97 %    Physical Exam  Vitals and nursing note reviewed.   Constitutional:       General: He is not in acute distress.     Appearance: Normal appearance. He is well-developed.      Comments: Pt seen and examined.   HENT:      Head: Normocephalic and atraumatic.      Nose: No congestion or rhinorrhea.   Eyes:      Extraocular Movements: Extraocular movements intact.      Pupils: Pupils are equal, round, and reactive to light.   Cardiovascular:      Rate and Rhythm: Normal rate and regular rhythm.      Pulses: Normal pulses.      Heart sounds: Normal heart sounds.   Pulmonary:      Effort: Pulmonary effort is normal. No respiratory distress.      Breath sounds: Normal breath sounds.   Abdominal:      General: Bowel sounds are normal. There is no distension.      Palpations: Abdomen is soft.      Tenderness: There is no abdominal tenderness.      Comments: Protuberant, nontender   Genitourinary:     Penis: Normal.    Musculoskeletal:         General: Swelling present. No tenderness. Normal range of motion.      Cervical back: Normal range  of motion and neck supple.   Skin:     General: Skin is warm and dry.      Findings: No erythema.   Neurological:      General: No focal deficit present.      Mental Status: He is alert and oriented to person, place, and time.   Psychiatric:         Behavior: Behavior normal.         Fluids    Intake/Output Summary (Last 24 hours) at 7/24/2021 0758  Last data filed at 7/24/2021 0348  Gross per 24 hour   Intake 100 ml   Output 1400 ml   Net -1300 ml       Laboratory  Recent Labs     07/21/21  0830   WBC 12.7*   RBC 4.31*   HEMOGLOBIN 11.9*   HEMATOCRIT 37.8*   MCV 87.7   MCH 27.6   MCHC 31.5*   RDW 46.4   PLATELETCT 182   MPV 10.8     Recent Labs     07/23/21  0040 07/23/21  0555 07/23/21  1215   SODIUM 155* 155* 150*   POTASSIUM 3.7 3.6 3.8   CHLORIDE 123* 123* 118*   CO2 23 25 21   GLUCOSE 351* 294* 383*   BUN 28* 25* 25*   CREATININE 1.70* 1.52* 1.57*   CALCIUM 8.2* 8.4* 8.1*                   Imaging  EC-ECHOCARDIOGRAM COMPLETE W/O CONT   Final Result      IR-MIDLINE CATHETER INSERTION WO GUIDANCE > AGE 3   Final Result                  Ultrasound-guided midline placement performed by qualified nursing staff    as above.          DX-CHEST-PORTABLE (1 VIEW)   Final Result      No acute cardiac or pulmonary abnormalities are identified.      CT-HEAD W/O   Final Result      No acute intracranial abnormality.              Assessment/Plan  * Diabetic ketoacidosis with coma (HCC)  Assessment & Plan  S/p ICU treatment, insulin drip, currently improved,  Continue short and long-acting insulin and adjust as indicated,  High hemoglobin A1c    Paroxysmal atrial fibrillation (HCC)  Assessment & Plan  Short-lived, converted to sinus rhythm  [id number] Score 2 -  Continue with prophylactic heparin dose  Echocardiogram overall benign  Hold off on anticoagulation for the time being    Essential hypertension  Assessment & Plan  History of, continue metoprolol, adjust medication as indicated  Consideration for ACE or ARB    Acute  metabolic encephalopathy  Assessment & Plan  Secondary to DKA, dehydration, metabolic derangement  Improved    Hypernatremia  Assessment & Plan  Secondary to free water depletion  Follow closely,  Encourage free water intake  Continue IV fluids for now    HIV positive (HCC)  Assessment & Plan  Started on antiviral treatment with the help of infectious disease,  Close outpatient follow-up in the clinic    Acute renal failure (ARF) ([facility])  Assessment & Plan  Secondary to dehydration, DKA  Improving slowly, monitor urine output  Troponin stable around 1.5 and possibly that is his baseline           VTE prophylaxis: heparin ppx    I have performed a physical exam and reviewed and updated ROS and Plan today (7/24/2021). In review of yesterday's note (7/23/2021), there are no changes except as documented above.

## 2021-07-25 ENCOUNTER — PHARMACY VISIT (OUTPATIENT)
Dept: PHARMACY | Facility: MEDICAL CENTER | Age: 56
End: 2021-07-25
Payer: COMMERCIAL

## 2021-07-25 VITALS
SYSTOLIC BLOOD PRESSURE: 120 MMHG | BODY MASS INDEX: 31.18 KG/M2 | WEIGHT: 210.54 LBS | HEART RATE: 72 BPM | TEMPERATURE: 99.4 F | OXYGEN SATURATION: 95 % | DIASTOLIC BLOOD PRESSURE: 78 MMHG | HEIGHT: 69 IN | RESPIRATION RATE: 17 BRPM

## 2021-07-25 LAB
ANION GAP SERPL CALC-SCNC: 10 MMOL/L (ref 7–16)
ANNOTATION COMMENT IMP: ABNORMAL
BACTERIA BLD CULT: NORMAL
BASOPHILS # BLD AUTO: 0 % (ref 0–1.8)
BASOPHILS # BLD: 0 K/UL (ref 0–0.12)
BUN SERPL-MCNC: 18 MG/DL (ref 8–22)
CALCIUM SERPL-MCNC: 8.6 MG/DL (ref 8.5–10.5)
CD3 CELLS # BLD: 679 CELLS/UL (ref 570–2400)
CD3+CD4+ CELLS # BLD: 255 CELLS/UL (ref 430–1800)
CD3+CD4+ CELLS/CD3+CD8+ CLL BLD: 0.62 RATIO (ref 0.8–3.9)
CD3+CD8+ CELLS # BLD: 408 CELLS/UL (ref 210–1200)
CHLORIDE SERPL-SCNC: 112 MMOL/L (ref 96–112)
CO2 SERPL-SCNC: 23 MMOL/L (ref 20–33)
CREAT SERPL-MCNC: 1.2 MG/DL (ref 0.5–1.4)
EOSINOPHIL # BLD AUTO: 0.26 K/UL (ref 0–0.51)
EOSINOPHIL NFR BLD: 5 % (ref 0–6.9)
ERYTHROCYTE [DISTWIDTH] IN BLOOD BY AUTOMATED COUNT: 42.1 FL (ref 35.9–50)
GLUCOSE BLD-MCNC: 140 MG/DL (ref 65–99)
GLUCOSE BLD-MCNC: 152 MG/DL (ref 65–99)
GLUCOSE SERPL-MCNC: 181 MG/DL (ref 65–99)
HCT VFR BLD AUTO: 34.3 % (ref 42–52)
HGB BLD-MCNC: 11 G/DL (ref 14–18)
IMM GRANULOCYTES # BLD AUTO: 0.01 K/UL (ref 0–0.11)
IMM GRANULOCYTES NFR BLD AUTO: 0.2 % (ref 0–0.9)
LYMPHOCYTES # BLD AUTO: 1.96 K/UL (ref 1–4.8)
LYMPHOCYTES NFR BLD: 37.9 % (ref 22–41)
MCH RBC QN AUTO: 27.8 PG (ref 27–33)
MCHC RBC AUTO-ENTMCNC: 32.1 G/DL (ref 33.7–35.3)
MCV RBC AUTO: 86.6 FL (ref 81.4–97.8)
MONOCYTES # BLD AUTO: 0.46 K/UL (ref 0–0.85)
MONOCYTES NFR BLD AUTO: 8.9 % (ref 0–13.4)
NEUTROPHILS # BLD AUTO: 2.48 K/UL (ref 1.82–7.42)
NEUTROPHILS NFR BLD: 48 % (ref 44–72)
NRBC # BLD AUTO: 0 K/UL
NRBC BLD-RTO: 0 /100 WBC
PLATELET # BLD AUTO: 121 K/UL (ref 164–446)
PMV BLD AUTO: 11.1 FL (ref 9–12.9)
POTASSIUM SERPL-SCNC: 3.3 MMOL/L (ref 3.6–5.5)
RBC # BLD AUTO: 3.96 M/UL (ref 4.7–6.1)
SIGNIFICANT IND 70042: NORMAL
SITE SITE: NORMAL
SODIUM SERPL-SCNC: 145 MMOL/L (ref 135–145)
SOURCE SOURCE: NORMAL
WBC # BLD AUTO: 5.2 K/UL (ref 4.8–10.8)

## 2021-07-25 PROCEDURE — 82962 GLUCOSE BLOOD TEST: CPT

## 2021-07-25 PROCEDURE — A9270 NON-COVERED ITEM OR SERVICE: HCPCS | Performed by: INTERNAL MEDICINE

## 2021-07-25 PROCEDURE — 85025 COMPLETE CBC W/AUTO DIFF WBC: CPT

## 2021-07-25 PROCEDURE — 700111 HCHG RX REV CODE 636 W/ 250 OVERRIDE (IP): Performed by: INTERNAL MEDICINE

## 2021-07-25 PROCEDURE — 80048 BASIC METABOLIC PNL TOTAL CA: CPT

## 2021-07-25 PROCEDURE — RXMED WILLOW AMBULATORY MEDICATION CHARGE: Performed by: INTERNAL MEDICINE

## 2021-07-25 PROCEDURE — 700102 HCHG RX REV CODE 250 W/ 637 OVERRIDE(OP): Performed by: INTERNAL MEDICINE

## 2021-07-25 PROCEDURE — 99239 HOSP IP/OBS DSCHRG MGMT >30: CPT | Performed by: INTERNAL MEDICINE

## 2021-07-25 PROCEDURE — 700105 HCHG RX REV CODE 258: Performed by: HOSPITALIST

## 2021-07-25 RX ORDER — LANCETS 30 GAUGE
EACH MISCELLANEOUS
Qty: 100 EACH | Refills: 0 | Status: SHIPPED | OUTPATIENT
Start: 2021-07-25

## 2021-07-25 RX ORDER — CALCIUM CITRATE/VITAMIN D3 200MG-6.25
TABLET ORAL
Qty: 100 STRIP | Refills: 0 | Status: SHIPPED | OUTPATIENT
Start: 2021-07-25

## 2021-07-25 RX ORDER — IBUPROFEN 200 MG
TABLET ORAL
Qty: 100 EACH | Refills: 0 | Status: SHIPPED | OUTPATIENT
Start: 2021-07-25

## 2021-07-25 RX ORDER — METOPROLOL TARTRATE 50 MG/1
50 TABLET, FILM COATED ORAL 2 TIMES DAILY
Qty: 60 TABLET | Refills: 0 | Status: SHIPPED | OUTPATIENT
Start: 2021-07-25

## 2021-07-25 RX ORDER — INSULIN GLARGINE 100 [IU]/ML
25 INJECTION, SOLUTION SUBCUTANEOUS 2 TIMES DAILY
Qty: 15 ML | Refills: 0 | Status: SHIPPED | OUTPATIENT
Start: 2021-07-25

## 2021-07-25 RX ORDER — INSULIN LISPRO 100 [IU]/ML
INJECTION, SOLUTION INTRAVENOUS; SUBCUTANEOUS
Qty: 100 ML | Refills: 0 | Status: SHIPPED | OUTPATIENT
Start: 2021-07-25

## 2021-07-25 RX ORDER — DIPHENHYDRAMINE HYDROCHLORIDE 25 MG/1
CAPSULE, LIQUID FILLED ORAL
Qty: 1 KIT | Refills: 0 | Status: SHIPPED | OUTPATIENT
Start: 2021-07-25

## 2021-07-25 RX ORDER — GLUCOSAMINE HCL/CHONDROITIN SU 500-400 MG
CAPSULE ORAL
Qty: 100 EACH | Refills: 0 | Status: SHIPPED | OUTPATIENT
Start: 2021-07-25

## 2021-07-25 RX ADMIN — BICTEGRAVIR SODIUM, EMTRICITABINE, AND TENOFOVIR ALAFENAMIDE FUMARATE 1 TABLET: 50; 200; 25 TABLET ORAL at 05:13

## 2021-07-25 RX ADMIN — ASPIRIN 81 MG: 81 TABLET, COATED ORAL at 05:13

## 2021-07-25 RX ADMIN — SODIUM CHLORIDE: 4.5 INJECTION, SOLUTION INTRAVENOUS at 05:12

## 2021-07-25 RX ADMIN — HEPARIN SODIUM 5000 UNITS: 5000 INJECTION, SOLUTION INTRAVENOUS; SUBCUTANEOUS at 05:14

## 2021-07-25 RX ADMIN — METOPROLOL TARTRATE 50 MG: 50 TABLET, FILM COATED ORAL at 05:13

## 2021-07-25 NOTE — DISCHARGE SUMMARY
"Discharge Summary    CHIEF COMPLAINT ON ADMISSION  Chief Complaint   Patient presents with   • ALOC     AOx1, GCS 13 - very lethargic and difficulty with following commands. No reported N/V/D, cough, fever, or pain. No recent trauma.   • Hyperglycemia     Glucometer reads \"Hi\" - With no history of DM. The patient is HIV+ and has been off his medications for 6 months.       Reason for Admission  ALOC     Admission Date  7/20/2021    CODE STATUS  Full Code    HPI & HOSPITAL COURSE  This is a 56-year-old  male with a history of HIV, off HAART medication for approximately 6 months, not followed by physicians presents with altered level of consciousness, encephalopathy, and severe hyperglycemia, reportedly the patient was worsening for approximately 3 days at home.  The patient found in severe DKA, acute renal failure.  Reportedly the patient was previously treated for hypertension.  The patient mated to ICU, insulin drip, was found to have severely elevated sodium levels requiring aggressive medication titration and management.  The patient was restarted on HAART treatment in conjunction of infectious disease.  The patient had episode of atrial fibrillation with RVR and was treated with beta-blockade and amiodarone intravenously, drip, converting to sinus rhythm, and echocardiogram showed overall normal structures, EF of 52%.  He was then subsequently transferred back to the floor after his DKA has improved.  His A1c was 11.3.  Diabetes educator was consulted and he was given diabetes education along with insulin, glucometer and additional medication as below.  He was also found to have new onset atrial fibrillation and reverted back to normal sinus rhythm after initial amiodarone infusion and metoprolol.  His [id number] Score 2 and echocardiogram was benign.  I discussed it with him about anticoagulation and and he stated that he like to follow-up with his primary care physician as an outpatient and will decide " whether he want to take anticoagulation then. his acute kidney injury has already resolved.  I saw and examined the patient today.  Please call [phone] to schedule PCP appointment for patient.    Required specialty appointments include:     As below    Therefore, he is discharged in fair and stable condition to home with close outpatient follow-up.    The patient met 2-midnight criteria for an inpatient stay at the time of discharge.    Discharge Date  07/25/21      FOLLOW UP ITEMS POST DISCHARGE      DISCHARGE DIAGNOSES  Principal Problem:    Diabetic ketoacidosis with coma (HCC) POA: Unknown  Active Problems:    Acute renal failure (ARF) (HCC) POA: Unknown    HIV positive (HCC) POA: Unknown    Hypernatremia POA: Unknown    Acute metabolic encephalopathy POA: Unknown    Essential hypertension POA: Unknown    Paroxysmal atrial fibrillation (HCC) POA: Unknown  Resolved Problems:    Lung field abnormal finding on examination POA: Unknown      FOLLOW UP  No future appointments.  PCP     In 1 week      HIV clinic    In 1 week        MEDICATIONS ON DISCHARGE     Medication List      START taking these medications      Instructions   Alcohol Swabs   Doctor's comments: Per formulary preference. ICD-10 code: E11.65 Uncontrolled type 2 Diabetes Mellitus  Wipe site with prep pad prior to injection.     bictegravir-emtricitab-TAF -25 mg Tabs tablet  Start taking on: July 26, 2021  Commonly known as: Biktarvy   Take 1 tablet by mouth every day.  Dose: 1 tablet     * Blood Glucose Meter Kit   Doctor's comments: Or per formulary preference. ICD-10 code: E11.65 Uncontrolled type 2 Diabetes Mellitus  Test blood sugar as recommended by provider. Accucheck Guide blood glucose monitoring kit.     * Blood Glucose Test Strips   Doctor's comments: Or per formulary preference. ICD-10 code: E11.65 Uncontrolled type 2 Diabetes Mellitus  Use one Accucheck Guide strip to test blood sugar three times daily before meals.     insulin  glargine 100 UNIT/ML Soln  Commonly known as: Semglee   Inject 25 Units under the skin 2 times a day.  Dose: 25 Units     insulin lispro 100 UNIT/ML Sopn injection PEN  Commonly known as: HumaLOG,AdmeLOG   For -200: give 3 units, 201-250: give 5 units, 251-300: give 7 units, 301-350: give 9 units, 351-400: give 11 units, >401: give 13 units, if >401 for 2nd time, please call md     Insulin Pen Needle 32 G x 4 mm   Doctor's comments: Per patient/formulary preference. ICD-10 code: E11.65 Uncontrolled type 2 Diabetes Mellitus  Use one pen needle in pen device to inject insulin three times daily.     Insulin Syringes U-100 0.5 mL   Doctor's comments: Per formulary preference. ICD-10 code: E11.65 Uncontrolled type 2 Diabetes Mellitus  Use one syringe to inject insulin three times daily.     Lancets   Doctor's comments: Or per formulary preference. ICD-10 code: E11.65 Uncontrolled type 2 Diabetes Mellitus  Use one Accucheck Guide lancet to test blood sugar three times daily before meals.     metoprolol tartrate 50 MG Tabs  Commonly known as: LOPRESSOR   Take 1 tablet by mouth 2 times a day.  Dose: 50 mg         * This list has 2 medication(s) that are the same as other medications prescribed for you. Read the directions carefully, and ask your doctor or other care provider to review them with you.            CONTINUE taking these medications      Instructions   aspirin EC 81 MG Tbec  Commonly known as: ECOTRIN   Take 81 mg by mouth every day.  Dose: 81 mg     multivitamin Tabs   Take 1 tablet by mouth every day.  Dose: 1 tablet            Allergies  No Known Allergies    DIET  Orders Placed This Encounter   Procedures   • Diet Order Diet: Consistent CHO (Diabetic)     Standing Status:   Standing     Number of Occurrences:   1     Order Specific Question:   Diet:     Answer:   Consistent CHO (Diabetic) [4]       ACTIVITY  As tolerated.  Weight bearing as tolerated    CONSULTATIONS      PROCEDURES      LABORATORY  Lab  Results   Component Value Date    SODIUM 145 07/25/2021    POTASSIUM 3.3 (L) 07/25/2021    CHLORIDE 112 07/25/2021    CO2 23 07/25/2021    GLUCOSE 181 (H) 07/25/2021    BUN 18 07/25/2021    CREATININE 1.20 07/25/2021        Lab Results   Component Value Date    WBC 5.2 07/25/2021    HEMOGLOBIN 11.0 (L) 07/25/2021    HEMATOCRIT 34.3 (L) 07/25/2021    PLATELETCT 121 (L) 07/25/2021        Total time of the discharge process exceeds 44 minutes.

## 2021-07-25 NOTE — CARE PLAN
The patient is Watcher - Medium risk of patient condition declining or worsening    Shift Goals  Clinical Goals: Monitor BG  Patient Goals: Rest  Family Goals: Talk to Doctor    Progress made toward(s) clinical / shift goals:  Progressing      Problem: Knowledge Deficit - Standard  Goal: Patient and family/care givers will demonstrate understanding of plan of care, disease process/condition, diagnostic tests and medications  Outcome: Progressing

## 2021-07-25 NOTE — DISCHARGE INSTRUCTIONS
Discharge Instructions    Discharged to home by car with relative. Discharged via wheelchair, hospital escort: Yes.  Special equipment needed: Not Applicable    Be sure to schedule a follow-up appointment with your primary care doctor or any specialists as instructed.     Discharge Plan:        I understand that a diet low in cholesterol, fat, and sodium is recommended for good health. Unless I have been given specific instructions below for another diet, I accept this instruction as my diet prescription.   Other diet: Diabetic    Special Instructions: None    · Is patient discharged on Warfarin / Coumadin?   No     Depression / Suicide Risk    As you are discharged from this [facility] facility, it is important to learn how to keep safe from harming yourself.    Recognize the warning signs:  · Abrupt changes in personality, positive or negative- including increase in energy   · Giving away possessions  · Change in eating patterns- significant weight changes-  positive or negative  · Change in sleeping patterns- unable to sleep or sleeping all the time   · Unwillingness or inability to communicate  · Depression  · Unusual sadness, discouragement and loneliness  · Talk of wanting to die  · Neglect of personal appearance   · Rebelliousness- reckless behavior  · Withdrawal from people/activities they love  · Confusion- inability to concentrate     If you or a loved one observes any of these behaviors or has concerns about self-harm, here's what you can do:  · Talk about it- your feelings and reasons for harming yourself  · Remove any means that you might use to hurt yourself (examples: pills, rope, extension cords, firearm)  · Get professional help from the community (Mental Health, Substance Abuse, psychological counseling)  · Do not be alone:Call your Safe Contact- someone whom you trust who will be there for you.  · Call your local CRISIS HOTLINE 146-0061 or 776-356-6696  · Call your local Children's Mobile  Crisis Response Team [place] (869) 414-9354 or www.JourneyPure  · Call the toll free National Suicide Prevention Hotlines   · National Suicide Prevention Lifeline 222-537-ZGZV (2756)  · National Hope Line Network 800-SUICIDE (183-8183)      Type 2 Diabetes Mellitus, Diagnosis, Adult  Type 2 diabetes (type 2 diabetes mellitus) is a long-term (chronic) disease. It may be caused by one or both of these problems:  · Your pancreas does not make enough of a hormone called insulin.  · Your body does not react in a normal way to insulin that it makes.  Insulin lets sugars (glucose) go into cells in your body. This gives you energy. If you have type 2 diabetes, sugars cannot get into cells. This causes high blood sugar (hyperglycemia).  Your doctor will set treatment goals for you. Generally, you should have these blood sugar levels:  · Before meals (preprandial):  mg/dL (4.4-7.2 mmol/L).  · After meals (postprandial): below 180 mg/dL (10 mmol/L).  · A1c (hemoglobin A1c) level: less than 7%.  Follow these instructions at home:  Questions to ask your doctor  · You may want to ask these questions:  ? Do I need to meet with a diabetes educator?  ? Where can I find a support group for people with diabetes?  ? What equipment will I need to care for myself at home?  ? What diabetes medicines do I need? When should I take them?  ? How often do I need to check my blood sugar?  ? What number can I call if I have questions?  ? When is my next doctor's visit?  General instructions  · Take over-the-counter and prescription medicines only as told by your doctor.  · Keep all follow-up visits as told by your doctor. This is important.  Contact a doctor if:  · Your blood sugar is at or above 240 mg/dL (13.3 mmol/L) for 2 days in a row.  · You have been sick for 2 days or more, and you are not getting better.  · You have had a fever for 2 days or more, and you are not getting better.  · You have any of these problems for  more than 6 hours:  ? You cannot eat or drink.  ? You feel sick to your stomach (nauseous).  ? You throw up (vomit).  ? You have watery poop (diarrhea).  Get help right away if:  · Your blood sugar is lower than 54 mg/dL (3 mmol/L).  · You get confused.  · You have trouble:  ? Thinking clearly.  ? Breathing.  · You have moderate or large ketone levels in your pee (urine).  Summary  · Type 2 diabetes is a long-term (chronic) disease. Your pancreas may not make enough of a hormone called insulin, or your body may not react normally to insulin that it makes.  · Take over-the-counter and prescription medicines only as told by your doctor.  · Keep all follow-up visits as told by your doctor. This is important.  This information is not intended to replace advice given to you by your health care provider. Make sure you discuss any questions you have with your health care provider.  Document Released: 09/26/2009 Document Revised: 02/15/2019 Document Reviewed: 01/20/2017  [employer] Patient Education © 2020 [employer]      Type 2 Diabetes Mellitus, Self Care, Adult  When you have type 2 diabetes (type 2 diabetes mellitus), you must make sure your blood sugar (glucose) stays in a healthy range. You can do this with:  · Nutrition.  · Exercise.  · Lifestyle changes.  · Medicines or insulin, if needed.  · Support from your doctors and others.  How to stay aware of blood sugar    · Check your blood sugar level every day, as often as told.  · Have your A1c (hemoglobin A1c) level checked two or more times a year. Have it checked more often if your doctor tells you to.  Your doctor will set personal treatment goals for you. Generally, you should have these blood sugar levels:  · Before meals (preprandial):  mg/dL (4.4-7.2 mmol/L).  · After meals (postprandial): below 180 mg/dL (10 mmol/L).  · A1c level: less than 7%.  How to manage high and low blood sugar  Signs of high blood sugar  High blood sugar is called hyperglycemia.  Know the signs of high blood sugar. Signs may include:  · Feeling:  ? Thirsty.  ? Hungry.  ? Very tired.  · Needing to pee (urinate) more than usual.  · Blurry vision.  Signs of low blood sugar  Low blood sugar is called hypoglycemia. This is when blood sugar is at or below 70 mg/dL (3.9 mmol/L). Signs may include:  · Feeling:  ? Hungry.  ? Worried or nervous (anxious).  ? Sweaty and clammy.  ? Confused.  ? Dizzy.  ? Sleepy.  ? Sick to your stomach (nauseous).  · Having:  ? A fast heartbeat.  ? A headache.  ? A change in your vision.  ? Jerky movements that you cannot control (seizure).  ? Tingling or no feeling (numbness) around your mouth, lips, or tongue.  · Having trouble with:  ? Moving (coordination).  ? Sleeping.  ? Passing out (fainting).  ? Getting upset easily (irritability).  Treating low blood sugar  To treat low blood sugar, eat or drink something sugary right away. If you can think clearly and swallow safely, follow the 15:15 rule:  · Take 15 grams of a fast-acting carb (carbohydrate). Talk with your doctor about how much you should take.  · Some fast-acting carbs are:  ? Sugar tablets (glucose pills). Take 3-4 pills.  ? 6-8 pieces of hard candy.  ? 4-6 oz (120-150 mL) of fruit juice.  ? 4-6 oz (120-150 mL) of regular (not diet) soda.  ? 1 Tbsp (15 mL) honey or sugar.  · Check your blood sugar 15 minutes after you take the carb.  · If your blood sugar is still at or below 70 mg/dL (3.9 mmol/L), take 15 grams of a carb again.  · If your blood sugar does not go above 70 mg/dL (3.9 mmol/L) after 3 tries, get help right away.  · After your blood sugar goes back to normal, eat a meal or a snack within 1 hour.  Treating very low blood sugar  If your blood sugar is at or below 54 mg/dL (3 mmol/L), you have very low blood sugar (severe hypoglycemia). This is an emergency. Do not wait to see if the symptoms will go away. Get medical help right away. Call your local emergency services (911 in the [place]  If you  have very low blood sugar and you cannot eat or drink, you may need a glucagon shot (injection). A family member or friend should learn how to check your blood sugar and how to give you a glucagon shot. Ask your doctor if you need to have a glucagon shot kit at home.  Follow these instructions at home:  Medicine  · Take insulin and diabetes medicines as told.  · If your doctor says you should take more or less insulin and medicines, do this exactly as told.  · Do not run out of insulin or medicines.  Having diabetes can raise your risk for other long-term conditions. These include heart disease and kidney disease. Your doctor may prescribe medicines to help you not have these problems.  Food    · Make healthy food choices. These include:  ? Chicken, fish, egg whites, and beans.  ? Oats, whole wheat, bulgur, brown rice, quinoa, and millet.  ? Fresh fruits and vegetables.  ? Low-fat dairy products.  ? Nuts, avocado, olive oil, and canola oil.  · Meet with a  (dietitian). He or she can help you make an eating plan that is right for you.  · Follow instructions from your doctor about what you cannot eat or drink.  · Drink enough fluid to keep your pee (urine) pale yellow.  · Keep track of carbs that you eat. Do this by reading food labels and learning food serving sizes.  · Follow your sick day plan when you cannot eat or drink normally. Make this plan with your doctor so it is ready to use.  Activity  · Exercise 3 or more times a week.  · Do not go more than 2 days without exercising.  · Talk with your doctor before you start a new exercise. Your doctor may need to tell you to change:  ? How much insulin or medicines you take.  ? How much food you eat.  Lifestyle  · Do not use any tobacco products. These include cigarettes, chewing tobacco, and e-cigarettes. If you need help quitting, ask your doctor.  · Ask your doctor how much alcohol is safe for you.  · Learn to deal with stress. If you need help with  this, ask your doctor.  Body care    · Stay up to date with your shots (immunizations).  · Have your eyes and feet checked by a doctor as often as told.  · Check your skin and feet every day. Check for cuts, bruises, redness, blisters, or sores.  · Brush your teeth and gums two times a day. Floss one or more times a day.  · Go to the dentist one or more times every 6 months.  · Stay at a healthy weight.  General instructions  · Take over-the-counter and prescription medicines only as told by your doctor.  · Share your diabetes care plan with:  ? Your work or school.  ? People you live with.  · Carry a card or wear jewelry that says you have diabetes.  · Keep all follow-up visits as told by your doctor. This is important.  Questions to ask your doctor  · Do I need to meet with a diabetes educator?  · Where can I find a support group for people with diabetes?  Where to find more information  To learn more about diabetes, visit:  · American Diabetes Association: www.diabetes.org  · American Association of Diabetes Educators: www.diabeteseducator.org  Summary  · When you have type 2 diabetes, you must make sure your blood sugar (glucose) stays in a healthy range.  · Check your blood sugar every day, as often as told.  · Having diabetes can raise your risk for other conditions. Your doctor may prescribe medicines to help you not have these problems.  · Keep all follow-up visits as told by your doctor. This is important.  This information is not intended to replace advice given to you by your health care provider. Make sure you discuss any questions you have with your health care provider.  Document Released: 04/10/2017 Document Revised: 06/10/2019 Document Reviewed: 01/20/2017  [employer] Patient Education © 2020 [employer]    Diabetic Ketoacidosis  Diabetic ketoacidosis is a serious complication of diabetes. This condition develops when there is not enough insulin in the body. Insulin is an hormone that regulates blood  sugar levels in the body. Normally, insulin allows glucose to enter the cells in the body. The cells break down glucose for energy. Without enough insulin, the body cannot break down glucose, so it breaks down fats instead. This leads to high blood glucose levels in the body and the production of acids that are called ketones. Ketones are poisonous at high levels.  If diabetic ketoacidosis is not treated, it can cause severe dehydration and can lead to a coma or death.  What are the causes?  This condition develops when a lack of insulin causes the body to break down fats instead of glucose. This may be triggered by:  · Stress on the body. This stress is brought on by an illness.  · Infection.  · Medicines that raise blood glucose levels.  · Not taking diabetes medicine.  · New onset of type 1 diabetes mellitus.  What are the signs or symptoms?  Symptoms of this condition include:  · Fatigue.  · Weight loss.  · Excessive thirst.  · Light-headedness.  · Fruity or sweet-smelling breath.  · Excessive urination.  · Vision changes.  · Confusion or irritability.  · Nausea.  · Vomiting.  · Rapid breathing.  · Abdominal pain.  · Feeling flushed.  How is this diagnosed?  This condition is diagnosed based on your medical history, a physical exam, and blood tests. You may also have a urine test to check for ketones.  How is this treated?  This condition may be treated with:  · Fluid replacement. This may be done to correct dehydration.  · Insulin injections. These may be given through the skin or through an IV tube.  · Electrolyte replacement. Electrolytes are minerals in your blood. Electrolytes such as potassium and sodium may be given in pill form or through an IV tube.  · Antibiotic medicines. These may be prescribed if your condition was caused by an infection.  Diabetic ketoacidosis is a serious medical condition. You may need emergency treatment in the hospital to monitor your condition.  Follow these instructions at  home:  Eating and drinking  · Drink enough fluids to keep your urine clear or pale yellow.  · If you are not able to eat, drink clear fluids in small amounts as you are able. Clear fluids include water, ice chips, fruit juice with water added (diluted), and low-calorie sports drinks. You may also have sugar-free jello or popsicles.  · If you are able to eat, follow your usual diet and drink sugar-free liquids, such as water.  Medicines  · Take over-the-counter and prescription medicines only as told by your health care provider.  · Continue to take insulin and other diabetes medicines as told by your health care provider.  · If you were prescribed an antibiotic, take it as told by your health care provider. Do not stop taking the antibiotic even if you start to feel better.  General instructions    · Check your urine for ketones when you are ill and as told by your health care provider.  ? If your blood glucose is 240 mg/dL (13.3 mmol/L) or higher, check your urine ketones every 4-6 hours.  · Check your blood glucose every day, as often as told by your health care provider.  ? If your blood glucose is high, drink plenty of fluids. This helps to flush out ketones.  ? If your blood glucose is above your target for 2 tests in a row, contact your health care provider.  · Carry a medical alert card or wear medical alert jewelry that says that you have diabetes.  · Rest and exercise only as told by your health care provider. Do not exercise when your blood glucose is high and you have ketones in your urine.  · If you get sick, call your health care provider and begin treatment quickly. Your body often needs extra insulin to fight an illness. Check your blood glucose every 4-6 hours when you are sick.  · Keep all follow-up visits as told by your health care provider. This is important.  Contact a health care provider if:  · Your blood glucose level is higher than 240 mg/dL (13.3 mmol/L) for 2 days in a row.  · You have  moderate or large ketones in your urine.  · You have a fever.  · You cannot eat or drink without vomiting.  · You have been vomiting for more than 2 hours.  · You continue to have symptoms of diabetic ketoacidosis.  · You develop new symptoms.  Get help right away if:  · Your blood glucose monitor reads “high” even when you are taking insulin.  · You faint.  · You have chest pain.  · You have trouble breathing.  · You have sudden trouble speaking or swallowing.  · You have vomiting or diarrhea that gets worse after 3 hours.  · You are unable to stay awake.  · You have trouble thinking.  · You are severely dehydrated. Symptoms of severe dehydration include:  ? Extreme thirst.  ? Dry mouth.  ? Rapid breathing.  These symptoms may represent a serious problem that is an emergency. Do not wait to see if the symptoms will go away. Get medical help right away. Call your local emergency services (911 in the [place] Do not drive yourself to the hospital.  Summary  · Diabetic ketoacidosis is a serious complication of diabetes. This condition develops when there is not enough insulin in the body.  · This condition is diagnosed based on your medical history, a physical exam, and blood tests. You may also have a urine test to check for ketones.  · Diabetic ketoacidosis is a serious medical condition. You may need emergency treatment in the hospital to monitor your condition.  · Contact your health care provider if your blood glucose is higher than 240 mg/dl for 2 days in a row or if you have moderate or large ketones in your urine.  This information is not intended to replace advice given to you by your health care provider. Make sure you discuss any questions you have with your health care provider.  Document Released: 12/15/2001 Document Revised: 02/02/2018 Document Reviewed: 01/22/2018  [employer] Patient Education © 2020 [employer]

## 2021-07-25 NOTE — PROGRESS NOTES
D/c instructions given to pt regarding medications and follow up appt. Educated on worsening s/s, pt understands and questions answered. Pt will do curbside  medications on [facility].

## 2021-07-25 NOTE — PROGRESS NOTES
Assumed care of pt. Bedside report received from JENNIFER Walter. Pt was updated on plan of care. Call light, phone and personal belongings within reach. Bed locked in lowest position, 2 side rails up, pt is up self.

## 2021-08-09 LAB — MISCELLANEOUS LAB RESULT MISCLAB: NORMAL

## 2021-10-14 NOTE — CARE PLAN
Problem: Knowledge Deficit - Standard  Goal: Patient and family/care givers will demonstrate understanding of plan of care, disease process/condition, diagnostic tests and medications  Outcome: Progressing  Note: Pt updated on POC, tests, and medications. Pt verbalizes understanding and has no further questions at this time. Pt educated on calling for any more questions.        Problem: Pain - Standard  Goal: Alleviation of pain or a reduction in pain to the patient’s comfort goal  Outcome: Progressing  Note: Pt is able to verbalize pain on a scale of 1-10 and is able to verbalize comfort goal. Pain management plan followed and pt educated on nonpharmacologic and pharmacological comfort measures.      The patient is Watcher - Medium risk of patient condition declining or worsening    Shift Goals  Clinical Goals: control blood sugar,keep safety, maintain hemodnamically stable  Patient Goals: na  Family Goals: sugar control, rest and comfort         Yes